# Patient Record
(demographics unavailable — no encounter records)

---

## 2024-10-11 NOTE — ASSESSMENT
[FreeTextEntry1] :  A and P   77 y old F with PMH of DM, HTN, CKD,  s/p R wrist fracture s/p fall 2017 and Cervical and lumbar Disc disease s/p lumbar spinal surgery at [place] > 5 years ago followed by Rehab medicine/PT outside of 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 Slade Baldwin. completed adjuvant chemotherapy for 4 cycles followed by 1 year of immunotherapy. Preferred adjuvant chemotherapy will consist of pemetrexed and carboplatin given non-squamous histology. She received 1 cycle of carboplatin only as her GFR was worsened than her baseline and she could not receive Pemetrexed. Changed adjuvant chemotherapy to carboplatin/gemcitabine. -She completed adjuvant carboplatin/gemcitabine on 9/28/2023,  -On pembrolizumab for 12 months per keynote 091 showing improvement in DFS and OS compared to placebo, due to arthritis with L hand swelling and whole body stiffness with also limited range of motion of bl shoulders vs weakness missed one dose  plan was total of 18 cycles of  Pembrolizumab  -took low dose steroid with improved L hand swelling denies joint pain but has diffuse body arthralgia and myalgia and bl UE stiffness with weakness that is new after starting immunotherapy possible due to inflammatory arthritis  but will evaluate for possibe paraneoplastic myositis also serologies sent -suggested prednisone 30 mg taper 10 mg weekly and plan for possible biologic tx to able to complete immunotherapy as planned by oncology discussed with the patient -DEXA to evaluate for osteoporosis history of R wrist fracture with fall on ice 2017 ,  -cw PT           Daughter: Mildred Devorah

## 2024-10-11 NOTE — PHYSICAL EXAM
[General Appearance - In No Acute Distress] : in no acute distress [PERRL With Normal Accommodation] : pupils were equal in size, round, and reactive to light [Examination Of The Oral Cavity] : the lips and gums were normal [Oropharynx] : the oropharynx was normal [Respiration, Rhythm And Depth] : normal respiratory rhythm and effort [Auscultation Breath Sounds / Voice Sounds] : lungs were clear to auscultation bilaterally [Chest Palpation] : palpation of the chest revealed no abnormalities [Heart Rate And Rhythm] : heart rate was normal and rhythm regular [Heart Sounds] : normal S1 and S2 [Murmurs] : no murmurs [Edema] : there was no peripheral edema [Bowel Sounds] : normal bowel sounds [Abdomen Soft] : soft [Abdomen Tenderness] : non-tender [No Spinal Tenderness] : no spinal tenderness [Musculoskeletal - Swelling] : no joint swelling seen [] : no rash [FreeTextEntry1] : proximal upper extremity muscle weakness unable to raise above 90 degress no sig tenderness , + pain with active range of motion with raising above 90 degress

## 2024-10-11 NOTE — HISTORY OF PRESENT ILLNESS
[FreeTextEntry1] : CC :   HPI:    s/p Lumbar spinal surgery for multiple lumbar disc disease  11/2008 was suggested also cervical that she did not do

## 2024-10-17 NOTE — END OF VISIT
[] : Fellow [FreeTextEntry3] : 76 yo F with stage IIIA KRAS G12V mutant NSCLC, adenocarcinoma histology, CKD, concurrent RCC on active surveillance here for f/u. She is due for C15 pembrolizumab this week. She now reports worsening of her joint aches, now limiting her ADLs.  Her daughter is helping her with everything and she is not able to cook, clean or go to the grocery store. She still has not seen the rheumatologist. -- Will give the patient a trial of prednisone 20 mg daily x 5 days --I personally reached out to the rheumatologist, Dr. Angelo to expedite her appointment -- Ordered CBC, CMP, TSH -- Hold immunotherapy with pembrolizumab until irAE resolved (currently grade III) -- The patient has an acute exacerbation of her chronic illness/severe side effect from treatment -- Patient is on treatment with immunotherapy pembrolizumab needing intensive monitoring for toxicity with CBC, CMP every 3 weeks and STH Q 6 wks --RTC in 3 wks --Labs: CBC, CMP, TSH

## 2024-10-17 NOTE — END OF VISIT
[] : Fellow [FreeTextEntry3] : 78 yo F with stage IIIA KRAS G12V mutant NSCLC, adenocarcinoma histology, CKD, concurrent RCC on active surveillance here for f/u. She is due for C15 pembrolizumab this week. She now reports worsening of her joint aches, now limiting her ADLs.  Her daughter is helping her with everything and she is not able to cook, clean or go to the grocery store. She still has not seen the rheumatologist. -- Will give the patient a trial of prednisone 20 mg daily x 5 days --I personally reached out to the rheumatologist, Dr. Angelo to expedite her appointment -- Ordered CBC, CMP, TSH -- Hold immunotherapy with pembrolizumab until irAE resolved (currently grade III) -- The patient has an acute exacerbation of her chronic illness/severe side effect from treatment -- Patient is on treatment with immunotherapy pembrolizumab needing intensive monitoring for toxicity with CBC, CMP every 3 weeks and STH Q 6 wks --RTC in 3 wks --Labs: CBC, CMP, TSH

## 2024-10-22 NOTE — PHYSICAL EXAM
[Restricted in physically strenuous activity but ambulatory and able to carry out work of a light or sedentary nature] : Status 1- Restricted in physically strenuous activity but ambulatory and able to carry out work of a light or sedentary nature, e.g., light house work, office work [de-identified] : Tachycardic. Regular rhythm. Normal S1S2, no MRG. [de-identified] : No calf swelling. [de-identified] : Left shoulder restricted in active and passive ROM in all directions, without pain. Full active and passive ROM in right shoulder. Full ROM in all other extremities and small joints. [de-identified] : scalp lesion stable and healed  [Normal] : full range of motion and no deformities appreciated [de-identified] : Left facial asymmetry (hx [place] palsy 2021). [de-identified] : Left eye ptosis and tearing, rare twitching (hx [place] palsy 2021). [de-identified] : +back brace

## 2024-10-22 NOTE — PHYSICAL EXAM
[Restricted in physically strenuous activity but ambulatory and able to carry out work of a light or sedentary nature] : Status 1- Restricted in physically strenuous activity but ambulatory and able to carry out work of a light or sedentary nature, e.g., light house work, office work [de-identified] : Tachycardic. Regular rhythm. Normal S1S2, no MRG. [de-identified] : No calf swelling. [de-identified] : Left shoulder restricted in active and passive ROM in all directions, without pain. Full active and passive ROM in right shoulder. Full ROM in all other extremities and small joints. [de-identified] : scalp lesion stable and healed  [Normal] : full range of motion and no deformities appreciated [de-identified] : Left facial asymmetry (hx [place] palsy 2021). [de-identified] : Left eye ptosis and tearing, rare twitching (hx [place] palsy 2021). [de-identified] : +back brace

## 2024-10-22 NOTE — HISTORY OF PRESENT ILLNESS
[Disease: _____________________] : Disease: [unfilled] [T: ___] : T[unfilled] [N: ___] : N[unfilled] [M: ___] : M[unfilled] [AJCC Stage: ____] : AJCC Stage: [unfilled] [3] : 3, Severe [D] : probable [FreeTextEntry2] : Started oral  prednisone  10/1/2024 [90: Able to carry normal activity; minor signs or symptoms of disease.] : 90: Able to carry normal activity; minor signs or symptoms of disease.  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  Daughter: Mildred Fairchild   Onc hx: 12/2022: started having chest congestion, went to her PCP, who ordered CXR which showed a lung mass. She then had CT chest which showed a LLL mass, and a PET scan which showed only LLL mass. CT chest (Nov 2022): Mixed density consolidation in the left lower lobe is again seen.  There is mild interval progression of the peribronchial changes extending to the posterior medial lung base.  The more solid component seen anteriorly, bulging the major fissure is more significantly progressed measuring up to 5.4 x 3.1 cm in axial cross-section, previously 2.7 x 1.4 cm. PET (Dec 2022): Mild heterogeneous hypermetabolic activity in large parenchymal consolidation associated with volume loss and bronchiectasis in the left lower lobe of the lung.  The most intense activity is in the periphery of this consolidation.  This area of slightly more intense hypermetabolic activity may represent active infection/inflammation superimposed upon chronic lung disease.  Status post thyroidectomy.  Diverticular disease.  Fibroid uterus. 2/14/2022: Was sent to Dr. Acosta by Dr. Dc 4/21/2023: Final Diagnosis Left lower lobe, lung biopsy: -   Bronchial wall and adjoining parenchyma with atypical micropapillary cell clusters, most consistent with mucinous adenocarcinoma (serial sections examined). Note: The micropapillary clusters are composed of eosinophilic columnar cells, some with intracytoplasmic mucin vacuoles. On IHC they stain strongly for CK7, weakly for GATA3, and are negative for ER/ND, PAX8, TTF1, Napsin, p40, CD68 and CD31. Overall findings are compatible with mucinous adenocarcinoma of lung. Upper GI/pancreaticobiliary cancer, is also in the differential. PD-L1 3%  5/2/23 CT CAP 1. Since 12/9/2022, no significant change in persistent consolidation in left lower lobe, consistent with the reported mucinous adenocarcinoma. No lymphadenopathy or metastatic disease. 2. A 2.0 cm left renal mass is suspicious for renal cell carcinoma, probably clear cell type. 3. There are a few small hypodensities in the pancreatic head, some of which may be cystic. MRI of pancreas recommended for further evaluation.  5/3/23 MR BRAIN No evidence of intracranial metastatic disease.  Chronic small vessel ischemic findings, as above.  5/4/2023: TTB - surgery upfront given node negativity  5/23/2023: Pathology: Final Diagnosis  1.  Left level 9 lymph node, excision: -   Negative for tumor, 2 lymph nodes  (0/2)  2.  Level 8 lymph node, excision: -   Negative for tumor, 4 lymph nodes  (0/4)  3.  Level 7 lymph node, excision: -   Negative for tumor, 2 lymph nodes  (0/2)  4.  Left level 11 lymph nodes, excision: -   Negative for tumor, 1 lymph node  (0/1)  5.  Left level 12 lymph node, excision: -   Negative for tumor, 1 lymph node  (0/1)  6.  Left level 11 lymph nodes #2, excision: -   Negative for tumor, 1 lymph nodes  (0/1)  7.  Level 5 lymph nodes, excision: -   Negative for tumor, 2 lymph nodes  (0/2)  8.  Left lower lobe of lung, lobectomy: -   Mucinous adenocarcinoma, multifocal, less than 9 cm, invasive acinar and lipidic -   Negative for pleural invasion -   No evidence of lymphovascular invasion -   Bronchial and vascular margins negative for tumor -   Seven lymph nodes, negative for tumor  (0/7) -   Desquamative interstitial pneumonitis, prominent -   Additional findings: bronchiolization of airspaces, multifocal, minute focus of pneumonia  Comment: The tumor is multifocal within the mass with multiple separate foci of invasion, the two largest measuring 1 cm each (8I, 8K).  The mass grossly measures 9 cm, however, a few sections described as mass are negative for tumor, showing only desquamative interstitial pneumonitis.  Verified by: Sissy Bae M.D (Electronic Signature) Reported on: 05/30/23 09:34 EDT, [facility], [address] [place] [address], [place] [id number] Phone: [phone]   Fax: [phone] _________________________________________________________________  Synoptic Summary 8: Lung - Resection Specimen Procedure:  Lobectomy Specimen Laterality:   Left Tumor Tumor Focality:   Multifocal tumor nodules of similar histology  type not considered intrapulmonary metastases or too numerous for separate synoptic reports Tumor Site:  Lower lobe of lung Tumor Size Total Tumor Size:   9 Centimeters (cm) Histologic Type:   Invasive mucinous adenocarcinoma Visceral Pleura Invasion:   Not identified Direct Invasion of Adjacent Structures:    Not applicable (no adjacent structures present) Treatment Effect:   No known presurgical therapy Lymphovascular Invasion:   Not identified Margins Margin Status for Invasive Carcinoma:    All margins negative for invasive carcinoma Closest Margin(s) to Invasive Carcinoma:    Bronchial - 0.5; Vascular - 0.5 Distance from Invasive Carcinoma to Closest Margin:    At least 0.5 cm Margin Status for Non-Invasive Tumor:    All margins negative for non-invasive tumor Regional Lymph Nodes Lymph Node(s) from Prior Procedures:    No known prior lymph node sampling performed Regional Lymph Node Status:   All regional lymph nodes negative for tumor Number of Lymph Nodes Examined:   20 Sujey Site(s) Examined:   7: Subcarinal;  5: Subaortic / aortopulmonary (AP) / AP window;  8L: Para-esophageal;  9L: Pulmonary ligament;  10L: Hilar;  11L: Interlobar;  12L: Lobar Pathologic Stage Classification (pTNM, AJCC 8th Edition) pT Category:   pT4 pN Category:   pN0 10/16/23: CT Chest: Since May 3, 2023, no recurrence.  12/29/23: MRI Lumbar:  1.  No evidence of osseous metastasis. 2.  L3-4 severe spinal canal stenosis with effacement of the thecal sac and severe bilateral neural foraminal narrowing. There is mass effect on the exiting and descending nerve roots at this level. 3.  L5-S1 disc herniation contacts the descending right S1 nerve root.  12/29/23: MRI abdomen:  1.  As noted on previous studies enhancing mass upper pole left kidney; stable when compared to the most recent study from 9/20/2023, however, increased when compared to CT from 5/3/2022. It is suspicious for neoplasm. 2.  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 Recommend continued annual follow-up MRI for these lesions.  3/15/24: CT Chest:  1. Since 10/16/2023, there is no evidence of recurrent or metastatic lung cancer in the chest. 2. No change 2.4 cm left renal mass, probably a clear cell type renal cell carcinoma.  6/17/2024: CT chest: 1. Post left lower lobe lobectomy. No evidence of local disease recurrence or metastasis within the field-of-view. 2. 2.4 cm stable exophytic heterogeneously enhancing renal mass. Renal neoplasm cannot be excluded and correlation with prior MRI suggested.  10/17/2024: CT chest: 1. Left lower lobectomy. 3 mm groundglass micronodule at the posterior, peripheral left midlung, of questionable clinical significance. 2. 2.7 cm enhancing exophytic mass at the upper left kidney, possibly malignancy. Correlate with prior MRI findings.  [de-identified] : Mucinous adenocarcinoma - PD-L1 3%, GNAS R201C, KRAS G12V, VUS in MARIO [de-identified] : Pulm:  CTSx:   [FreeTextEntry1] : 7/7/2023: C1 carboplatin (pemetrexed held due to worsening Cr and GFR<45) 7/28/2023: C2D1 carboplatin/gem 8/04/2023  C2D8 [place] 8/25/2023: C3D1 carboplatin/[place] 9/1/2023:   C3D8 [place]  9/15/2023  C4D1  carbo/gem  9/21/23   C4D8 gem hold, neutropenic  9/28/23   C4D8 [place] 11/3/2023: C1 pembrolizumab 12/8/2023: C2 pembrolizumab  1/18/2024  C3 Pembro 2/8/2024    C4 Pembro  3/1/24.  C5 Pembro 3/22/2024: C6 pembrolizumab 4/12/2024: C7 pembrolizumab 5/3/2024:  C8 Pembro  5/24/24    C9  pembro  6/14/24    C10  Pembro  7/2/24      C11 Pembro  7/26/24    C12 pembrolizumab 8/23/24    C13 pushed a week back due to travel  9/13/2024: C14 pembrolizumab [de-identified] : The patient saw a rheumatology recently for her arthritis and was started on a high-dose steroid taper.  She is currently taking prednisone 20 mg, and is supposed to decrease it to 10 mg as of next week. She reports significant improvement in her ADLs and is now able to do everything [1] : 1, Mild [FreeTextEntry3] : b/l hand stiffness [FreeTextEntry5] : andre  [FreeTextEntry7] : lung cancer  [FreeTextEntry8] : scalp psoriasis flare up  [FreeTextEntry9] : 2/29/24  [de-identified] : andre  [de-identified] : lung cancer  [de-identified] : Topical steroid ointment , improved 6/11/24

## 2024-10-22 NOTE — REVIEW OF SYSTEMS
[Negative] : Allergic/Immunologic [FreeTextEntry9] : B/L shoulders, fingers and knee pain intermittently [Fever] : no fever [Chills] : no chills [Night Sweats] : no night sweats [Eye Pain] : no eye pain [Vision Problems] : no vision problems [Dysphagia] : no dysphagia [Odynophagia] : no odynophagia [Chest Pain] : no chest pain [Lower Ext Edema] : no lower extremity edema [Shortness Of Breath] : no shortness of breath [Wheezing] : no wheezing [Cough] : no cough [Abdominal Pain] : no abdominal pain [Vomiting] : no vomiting [Constipation] : no constipation [Joint Pain] : no joint pain [Joint Stiffness] : no joint stiffness [Muscle Pain] : no muscle pain [Skin Rash] : no skin rash [Skin Wound] : no skin wound [Dizziness] : no dizziness [Muscle Weakness] : no muscle weakness

## 2024-10-22 NOTE — ASSESSMENT
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  #NSCLC stage IIIA (pT4 N0 M0) s/p left lower lung biopsy (04/20/23) with [employer] NGS showing low TMB (1.6 mutations/MB), MSI negative (4.88%), TPS 3%.  s/p left lower lobectomy with RLND, left VATS (05/23/23) with Dr. Slade Baldwin. 0/20 lymph nodes. Negative surgical margins. Tumor was multifocal within the mass with multiple separate foci of invasion, the two largest measuring 1 cm. The mass grossly measures 9 cm. Surgery was uneventful and patient is recovering well. - CrCl calculated to be < 40 mL/min. Per guidelines for treatment with curative intent, she is cisplatin-ineligible. - Given stage IIIA disease, completed adjuvant chemotherapy for 4 cycles followed by 1 year of immunotherapy. Preferred adjuvant chemotherapy will consist of pemetrexed and carboplatin given non-squamous histology. She received 1 cycle of carboplatin only as her GFR was worsened than her baseline and she could not receive Pemetrexed. Changed adjuvant chemotherapy to carboplatin/gemcitabine. -She completed adjuvant carboplatin/gemcitabine on 9/28/2023  --now on pembrolizumab for 12 months per keynote 091 showing improvement in DFS and OS compared to placebo --She was scheduled for C14 pembro tomorrow however will hold it given her arthritis. Will re-evaluate after rheum eval. --plan for total 18 cycles of pembrolizumab -- I independently reviewed the images of her CT chest which are showing a new small groundglass opacity in the left lung.  Given this is barely visible and the rest appears to be stable, will opt for close observation right now with every 3-month scan. --Given that she is going to be on prednisone 10 mg daily as of next week, plan to reschedule pembrolizumab for next Friday 11/1 -- Ordered CBC, CMP, TSH --The patient is on therapy with pembrolizumab requiring intensive monitoring for toxicity like immune related adverse events such as arthritis, also monitoring with labs CBC, CMP every 3 weeks and TSH every 6 weeks  #arthritis Had grade 3 arthritis after each treatment, likely pembrolizumab induced.  -- She is currently seeing rheumatology, and is following a steroid taper  #RCC - on observation per Dr. Parr

## 2024-10-31 NOTE — ASSESSMENT
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 Slade Baldwin. completed adjuvant chemotherapy for 4 cycles followed by 1 year of immunotherapy. Preferred adjuvant chemotherapy will consist of pemetrexed and carboplatin given non-squamous histology. She received 1 cycle of carboplatin only as her GFR was worsened than her baseline and she could not receive Pemetrexed. Changed adjuvant chemotherapy to carboplatin/gemcitabine. -She completed adjuvant carboplatin/gemcitabine on 9/28/2023,  -On pembrolizumab for 12 months per keynote 091 showing improvement in DFS and OS compared to placebo, due to arthritis with L hand swelling and whole body stiffness with also limited range of motion of bl shoulders vs weakness missed one dose  plan was total of 18 cycles of  Pembrolizumab  -took low dose steroid with improved L hand swelling denies joint pain but has diffuse body arthralgia and myalgia and bl UE stiffness with weakness that is new after starting immunotherapy possible due to inflammatory arthritis  but will evaluate for possible paraneoplastic myositis also serologies sent -last visit suggested prednisone 30 mg taper 10 mg weekly and plan for possible biologic tx to able to complete immunotherapy as planned by oncology discussed with the patient, swelling improved states prior to starting prednisone had swelling and stiffness but no joint pain, advised to decrease to 5 mg planned for tx this week if no worse symptoms might be able to continue Pembrolizumab - but discussed that arthritis frequently seen as complication if worse swelling or pain might not be able to continue with it  -+ DOMENIC  and RNP that we can see with mixed connective tissue disease will check myomarker panel rest of the sub serologies negative no other symptoms of Lupus Crest, Scleroderma or myositis   -DEXA to evaluate for osteoporosis history of R wrist fracture with fall on ice 2017 ,  -cw PT   Daughter: Mildred Devorah

## 2024-10-31 NOTE — ASSESSMENT
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 Slade Baldwin. completed adjuvant chemotherapy for 4 cycles followed by 1 year of immunotherapy. Preferred adjuvant chemotherapy will consist of pemetrexed and carboplatin given non-squamous histology. She received 1 cycle of carboplatin only as her GFR was worsened than her baseline and she could not receive Pemetrexed. Changed adjuvant chemotherapy to carboplatin/gemcitabine. -She completed adjuvant carboplatin/gemcitabine on 9/28/2023,  -On pembrolizumab for 12 months per keynote 091 showing improvement in DFS and OS compared to placebo, due to arthritis with L hand swelling and whole body stiffness with also limited range of motion of bl shoulders vs weakness missed one dose  plan was total of 18 cycles of  Pembrolizumab  -took low dose steroid with improved L hand swelling denies joint pain but has diffuse body arthralgia and myalgia and bl UE stiffness with weakness that is new after starting immunotherapy possible due to inflammatory arthritis  but will evaluate for possible paraneoplastic myositis also serologies sent -last visit suggested prednisone 30 mg taper 10 mg weekly and plan for possible biologic tx to able to complete immunotherapy as planned by oncology discussed with the patient, swelling improved states prior to starting prednisone had swelling and stiffness but no joint pain, advised to decrease to 5 mg planned for tx this week if no worse symptoms might be able to continue Pembrolizumab - but discussed that arthritis frequently seen as complication if worse swelling or pain might not be able to continue with it  -+ DOMENIC  and RNP that we can see with mixed connective tissue disease will check myomarker panel rest of the sub serologies negative no other symptoms of Lupus Crest, Scleroderma or myositis   -DEXA to evaluate for osteoporosis history of R wrist fracture with fall on ice 2017 ,  -cw PT   Daughter: Mildred Devorah

## 2024-10-31 NOTE — HISTORY OF PRESENT ILLNESS
[___ Week(s) Ago] : [unfilled] week(s) ago [FreeTextEntry1] : 10/11/24  first time evaluated by me for bl hand swelling and stiffness possible inflammatory arthritis after started tx with Pempbrolizumab for 12 month that was interrupted due to development of arthritis then started with Prednisone 30 mg with taper over last 3 weeks with resolved bl hand swelling and stiffness, denies pain even before arthritis  rest of the review system negative , prednisone down to 10 mg for this week

## 2024-11-18 NOTE — HISTORY OF PRESENT ILLNESS
[Disease: _____________________] : Disease: [unfilled] [T: ___] : T[unfilled] [N: ___] : N[unfilled] [M: ___] : M[unfilled] [AJCC Stage: ____] : AJCC Stage: [unfilled] [1] : 1, Mild [D] : probable [90: Able to carry normal activity; minor signs or symptoms of disease.] : 90: Able to carry normal activity; minor signs or symptoms of disease.  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  Daughter: Mildred Fairchild   Onc hx: 12/2022: started having chest congestion, went to her PCP, who ordered CXR which showed a lung mass. She then had CT chest which showed a LLL mass, and a PET scan which showed only LLL mass. CT chest (Nov 2022): Mixed density consolidation in the left lower lobe is again seen.  There is mild interval progression of the peribronchial changes extending to the posterior medial lung base.  The more solid component seen anteriorly, bulging the major fissure is more significantly progressed measuring up to 5.4 x 3.1 cm in axial cross-section, previously 2.7 x 1.4 cm. PET (Dec 2022): Mild heterogeneous hypermetabolic activity in large parenchymal consolidation associated with volume loss and bronchiectasis in the left lower lobe of the lung.  The most intense activity is in the periphery of this consolidation.  This area of slightly more intense hypermetabolic activity may represent active infection/inflammation superimposed upon chronic lung disease.  Status post thyroidectomy.  Diverticular disease.  Fibroid uterus. 2/14/2022: Was sent to Dr. Acosta by Dr. Dc 4/21/2023: Final Diagnosis Left lower lobe, lung biopsy: -   Bronchial wall and adjoining parenchyma with atypical micropapillary cell clusters, most consistent with mucinous adenocarcinoma (serial sections examined). Note: The micropapillary clusters are composed of eosinophilic columnar cells, some with intracytoplasmic mucin vacuoles. On IHC they stain strongly for CK7, weakly for GATA3, and are negative for ER/NH, PAX8, TTF1, Napsin, p40, CD68 and CD31. Overall findings are compatible with mucinous adenocarcinoma of lung. Upper GI/pancreaticobiliary cancer, is also in the differential. PD-L1 3%  5/2/23 CT CAP 1. Since 12/9/2022, no significant change in persistent consolidation in left lower lobe, consistent with the reported mucinous adenocarcinoma. No lymphadenopathy or metastatic disease. 2. A 2.0 cm left renal mass is suspicious for renal cell carcinoma, probably clear cell type. 3. There are a few small hypodensities in the pancreatic head, some of which may be cystic. MRI of pancreas recommended for further evaluation.  5/3/23 MR BRAIN No evidence of intracranial metastatic disease.  Chronic small vessel ischemic findings, as above.  5/4/2023: TTB - surgery upfront given node negativity  5/23/2023: Pathology: Final Diagnosis  1.  Left level 9 lymph node, excision: -   Negative for tumor, 2 lymph nodes  (0/2)  2.  Level 8 lymph node, excision: -   Negative for tumor, 4 lymph nodes  (0/4)  3.  Level 7 lymph node, excision: -   Negative for tumor, 2 lymph nodes  (0/2)  4.  Left level 11 lymph nodes, excision: -   Negative for tumor, 1 lymph node  (0/1)  5.  Left level 12 lymph node, excision: -   Negative for tumor, 1 lymph node  (0/1)  6.  Left level 11 lymph nodes #2, excision: -   Negative for tumor, 1 lymph nodes  (0/1)  7.  Level 5 lymph nodes, excision: -   Negative for tumor, 2 lymph nodes  (0/2)  8.  Left lower lobe of lung, lobectomy: -   Mucinous adenocarcinoma, multifocal, less than 9 cm, invasive acinar and lipidic -   Negative for pleural invasion -   No evidence of lymphovascular invasion -   Bronchial and vascular margins negative for tumor -   Seven lymph nodes, negative for tumor  (0/7) -   Desquamative interstitial pneumonitis, prominent -   Additional findings: bronchiolization of airspaces, multifocal, minute focus of pneumonia  Comment: The tumor is multifocal within the mass with multiple separate foci of invasion, the two largest measuring 1 cm each (8I, 8K).  The mass grossly measures 9 cm, however, a few sections described as mass are negative for tumor, showing only desquamative interstitial pneumonitis.  Verified by: Sissy Bae M.D (Electronic Signature) Reported on: 05/30/23 09:34 EDT, [facility], [address] [place] [address], [place] [id number] Phone: [phone]   Fax: [phone] _________________________________________________________________  Synoptic Summary 8: Lung - Resection Specimen Procedure:  Lobectomy Specimen Laterality:   Left Tumor Tumor Focality:   Multifocal tumor nodules of similar histology  type not considered intrapulmonary metastases or too numerous for separate synoptic reports Tumor Site:  Lower lobe of lung Tumor Size Total Tumor Size:   9 Centimeters (cm) Histologic Type:   Invasive mucinous adenocarcinoma Visceral Pleura Invasion:   Not identified Direct Invasion of Adjacent Structures:    Not applicable (no adjacent structures present) Treatment Effect:   No known presurgical therapy Lymphovascular Invasion:   Not identified Margins Margin Status for Invasive Carcinoma:    All margins negative for invasive carcinoma Closest Margin(s) to Invasive Carcinoma:    Bronchial - 0.5; Vascular - 0.5 Distance from Invasive Carcinoma to Closest Margin:    At least 0.5 cm Margin Status for Non-Invasive Tumor:    All margins negative for non-invasive tumor Regional Lymph Nodes Lymph Node(s) from Prior Procedures:    No known prior lymph node sampling performed Regional Lymph Node Status:   All regional lymph nodes negative for tumor Number of Lymph Nodes Examined:   20 Sujey Site(s) Examined:   7: Subcarinal;  5: Subaortic / aortopulmonary (AP) / AP window;  8L: Para-esophageal;  9L: Pulmonary ligament;  10L: Hilar;  11L: Interlobar;  12L: Lobar Pathologic Stage Classification (pTNM, AJCC 8th Edition) pT Category:   pT4 pN Category:   pN0 10/16/23: CT Chest: Since May 3, 2023, no recurrence.  12/29/23: MRI Lumbar:  1.  No evidence of osseous metastasis. 2.  L3-4 severe spinal canal stenosis with effacement of the thecal sac and severe bilateral neural foraminal narrowing. There is mass effect on the exiting and descending nerve roots at this level. 3.  L5-S1 disc herniation contacts the descending right S1 nerve root.  12/29/23: MRI abdomen:  1.  As noted on previous studies enhancing mass upper pole left kidney; stable when compared to the most recent study from 9/20/2023, however, increased when compared to CT from 5/3/2022. It is suspicious for neoplasm. 2.  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 Recommend continued annual follow-up MRI for these lesions.  3/15/24: CT Chest:  1. Since 10/16/2023, there is no evidence of recurrent or metastatic lung cancer in the chest. 2. No change 2.4 cm left renal mass, probably a clear cell type renal cell carcinoma.  6/17/2024: CT chest: 1. Post left lower lobe lobectomy. No evidence of local disease recurrence or metastasis within the field-of-view. 2. 2.4 cm stable exophytic heterogeneously enhancing renal mass. Renal neoplasm cannot be excluded and correlation with prior MRI suggested.  10/17/2024: CT chest: 1. Left lower lobectomy. 3 mm groundglass micronodule at the posterior, peripheral left midlung, of questionable clinical significance. 2. 2.7 cm enhancing exophytic mass at the upper left kidney, possibly malignancy. Correlate with prior MRI findings.  [de-identified] : Mucinous adenocarcinoma - PD-L1 3%, GNAS R201C, KRAS G12V, VUS in MARIO [de-identified] : Pulm:  CTSx:   [FreeTextEntry1] : 7/7/2023: C1 carboplatin (pemetrexed held due to worsening Cr and GFR<45) 7/28/2023: C2D1 carboplatin/gem 8/04/2023  C2D8 Ponce 8/25/2023: C3D1 carboplatin/Ponce 9/1/2023:   C3D8 Ponce  9/15/2023  C4D1  carbo/gem  9/21/23   C4D8 gem hold, neutropenic  9/28/23   C4D8 Ponce 11/3/2023: C1 pembrolizumab 12/8/2023: C2 pembrolizumab  1/18/2024  C3 Pembro 2/8/2024    C4 Pembro  3/1/24.  C5 Pembro 3/22/2024: C6 pembrolizumab 4/12/2024: C7 pembrolizumab 5/3/2024:  C8 Pembro  5/24/24    C9  pembro  6/14/24    C10  Pembro  7/2/24      C11 Pembro  7/26/24    C12 pembrolizumab 8/23/24    C13 pushed a week back due to travel  9/13/2024: C14 pembrolizumab 11/1/2024: C15 pembrolizumab (delayed due to ir arthritis needing steroid taper) 11/22/2024: Due for C16 pembrolizumab [de-identified] : The patient is off prednisone. Was told by rheumatologist she wants her to start prednisone 5 mg daily, but scripts were never sent per patient. Reports o/w feeling ok, except for recurrent flare ups of arthritis. [FreeTextEntry3] : b/l hand stiffness due to arthritis [FreeTextEntry5] : andre  [FreeTextEntry8] : scalp psoriasis flare up  [FreeTextEntry7] : lung cancer  [FreeTextEntry9] : 2/29/24  [de-identified] : andre  [de-identified] : lung cancer  [de-identified] : Topical steroid ointment , improved 6/11/24

## 2024-11-18 NOTE — REVIEW OF SYSTEMS
[Negative] : Allergic/Immunologic [Fever] : no fever [Chills] : no chills [Night Sweats] : no night sweats [Eye Pain] : no eye pain [Vision Problems] : no vision problems [Dysphagia] : no dysphagia [Odynophagia] : no odynophagia [Chest Pain] : no chest pain [Shortness Of Breath] : no shortness of breath [Lower Ext Edema] : no lower extremity edema [Wheezing] : no wheezing [Cough] : no cough [Abdominal Pain] : no abdominal pain [Vomiting] : no vomiting [Constipation] : no constipation [Joint Pain] : no joint pain [Joint Stiffness] : no joint stiffness [Muscle Pain] : no muscle pain [Skin Rash] : no skin rash [Skin Wound] : no skin wound [Dizziness] : no dizziness [Muscle Weakness] : no muscle weakness

## 2024-11-18 NOTE — PHYSICAL EXAM
[Restricted in physically strenuous activity but ambulatory and able to carry out work of a light or sedentary nature] : Status 1- Restricted in physically strenuous activity but ambulatory and able to carry out work of a light or sedentary nature, e.g., light house work, office work [Normal] : affect appropriate [de-identified] : Left facial asymmetry (hx [place] palsy 2021). [de-identified] : Left eye ptosis and tearing, rare twitching (hx [place] palsy 2021). [de-identified] : +back brace

## 2024-11-18 NOTE — REVIEW OF SYSTEMS
[Negative] : Allergic/Immunologic [Fever] : no fever [Chills] : no chills [Night Sweats] : no night sweats [Eye Pain] : no eye pain [Vision Problems] : no vision problems [Dysphagia] : no dysphagia [Odynophagia] : no odynophagia [Chest Pain] : no chest pain [Lower Ext Edema] : no lower extremity edema [Shortness Of Breath] : no shortness of breath [Wheezing] : no wheezing [Cough] : no cough [Abdominal Pain] : no abdominal pain [Vomiting] : no vomiting [Constipation] : no constipation [Joint Pain] : no joint pain [Joint Stiffness] : no joint stiffness [Muscle Pain] : no muscle pain [Skin Rash] : no skin rash [Skin Wound] : no skin wound [Dizziness] : no dizziness [Muscle Weakness] : no muscle weakness

## 2024-11-18 NOTE — ASSESSMENT
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  #NSCLC stage IIIA (pT4 N0 M0) s/p left lower lung biopsy (04/20/23) with [employer] NGS showing low TMB (1.6 mutations/MB), MSI negative (4.88%), TPS 3%.  s/p left lower lobectomy with RLND, left VATS (05/23/23) with Dr. Slade Baldwin. 0/20 lymph nodes. Negative surgical margins. Tumor was multifocal within the mass with multiple separate foci of invasion, the two largest measuring 1 cm. The mass grossly measures 9 cm. Surgery was uneventful and patient is recovering well. - CrCl calculated to be < 40 mL/min. Per guidelines for treatment with curative intent, she is cisplatin-ineligible. - Given stage IIIA disease, completed adjuvant chemotherapy for 4 cycles followed by 1 year of immunotherapy. Preferred adjuvant chemotherapy will consist of pemetrexed and carboplatin given non-squamous histology. She received 1 cycle of carboplatin only as her GFR was worsened than her baseline and she could not receive Pemetrexed. Changed adjuvant chemotherapy to carboplatin/gemcitabine. -She completed adjuvant carboplatin/gemcitabine on 9/28/2023  --now on pembrolizumab for 12 months per keynote 091 showing improvement in DFS and OS compared to placebo --planned for total 18 cycles of pembrolizumab -- Ordered CBC, CMP, TSH --noted Cr 1.8 today - I am concerned about pembrolizumab induced AIN - will refer back to , nephrology for evaluation --The patient is on therapy with pembrolizumab requiring intensive monitoring for toxicity like immune related adverse events such as arthritis, also monitoring with labs CBC, CMP every 3 weeks and TSH every 6 weeks  ROSALINDA on CKD - concerning for irAIN. --emailed  - will likely need to hold further immunotherapy pending his recommendations  Arthritis Had grade 3 arthritis after each treatment, likely pembrolizumab induced.  -- Spoke to  today. Summary being, she doesn't think the patient needs steroids. She will try to use other DMARD to ensure she can complete her adjuvant therapy. (3 more cycles left)  RCC - on observation per Dr. Parr

## 2024-11-18 NOTE — HISTORY OF PRESENT ILLNESS
[Disease: _____________________] : Disease: [unfilled] [T: ___] : T[unfilled] [N: ___] : N[unfilled] [M: ___] : M[unfilled] [AJCC Stage: ____] : AJCC Stage: [unfilled] [1] : 1, Mild [D] : probable [90: Able to carry normal activity; minor signs or symptoms of disease.] : 90: Able to carry normal activity; minor signs or symptoms of disease.  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  Daughter: Mildred Fairchild   Onc hx: 12/2022: started having chest congestion, went to her PCP, who ordered CXR which showed a lung mass. She then had CT chest which showed a LLL mass, and a PET scan which showed only LLL mass. CT chest (Nov 2022): Mixed density consolidation in the left lower lobe is again seen.  There is mild interval progression of the peribronchial changes extending to the posterior medial lung base.  The more solid component seen anteriorly, bulging the major fissure is more significantly progressed measuring up to 5.4 x 3.1 cm in axial cross-section, previously 2.7 x 1.4 cm. PET (Dec 2022): Mild heterogeneous hypermetabolic activity in large parenchymal consolidation associated with volume loss and bronchiectasis in the left lower lobe of the lung.  The most intense activity is in the periphery of this consolidation.  This area of slightly more intense hypermetabolic activity may represent active infection/inflammation superimposed upon chronic lung disease.  Status post thyroidectomy.  Diverticular disease.  Fibroid uterus. 2/14/2022: Was sent to Dr. Aocsta by Dr. Dc 4/21/2023: Final Diagnosis Left lower lobe, lung biopsy: -   Bronchial wall and adjoining parenchyma with atypical micropapillary cell clusters, most consistent with mucinous adenocarcinoma (serial sections examined). Note: The micropapillary clusters are composed of eosinophilic columnar cells, some with intracytoplasmic mucin vacuoles. On IHC they stain strongly for CK7, weakly for GATA3, and are negative for ER/NY, PAX8, TTF1, Napsin, p40, CD68 and CD31. Overall findings are compatible with mucinous adenocarcinoma of lung. Upper GI/pancreaticobiliary cancer, is also in the differential. PD-L1 3%  5/2/23 CT CAP 1. Since 12/9/2022, no significant change in persistent consolidation in left lower lobe, consistent with the reported mucinous adenocarcinoma. No lymphadenopathy or metastatic disease. 2. A 2.0 cm left renal mass is suspicious for renal cell carcinoma, probably clear cell type. 3. There are a few small hypodensities in the pancreatic head, some of which may be cystic. MRI of pancreas recommended for further evaluation.  5/3/23 MR BRAIN No evidence of intracranial metastatic disease.  Chronic small vessel ischemic findings, as above.  5/4/2023: TTB - surgery upfront given node negativity  5/23/2023: Pathology: Final Diagnosis  1.  Left level 9 lymph node, excision: -   Negative for tumor, 2 lymph nodes  (0/2)  2.  Level 8 lymph node, excision: -   Negative for tumor, 4 lymph nodes  (0/4)  3.  Level 7 lymph node, excision: -   Negative for tumor, 2 lymph nodes  (0/2)  4.  Left level 11 lymph nodes, excision: -   Negative for tumor, 1 lymph node  (0/1)  5.  Left level 12 lymph node, excision: -   Negative for tumor, 1 lymph node  (0/1)  6.  Left level 11 lymph nodes #2, excision: -   Negative for tumor, 1 lymph nodes  (0/1)  7.  Level 5 lymph nodes, excision: -   Negative for tumor, 2 lymph nodes  (0/2)  8.  Left lower lobe of lung, lobectomy: -   Mucinous adenocarcinoma, multifocal, less than 9 cm, invasive acinar and lipidic -   Negative for pleural invasion -   No evidence of lymphovascular invasion -   Bronchial and vascular margins negative for tumor -   Seven lymph nodes, negative for tumor  (0/7) -   Desquamative interstitial pneumonitis, prominent -   Additional findings: bronchiolization of airspaces, multifocal, minute focus of pneumonia  Comment: The tumor is multifocal within the mass with multiple separate foci of invasion, the two largest measuring 1 cm each (8I, 8K).  The mass grossly measures 9 cm, however, a few sections described as mass are negative for tumor, showing only desquamative interstitial pneumonitis.  Verified by: Sissy Bae M.D (Electronic Signature) Reported on: 05/30/23 09:34 EDT, [facility], [address] [place] [address], [place] [id number] Phone: [phone]   Fax: [phone] _________________________________________________________________  Synoptic Summary 8: Lung - Resection Specimen Procedure:  Lobectomy Specimen Laterality:   Left Tumor Tumor Focality:   Multifocal tumor nodules of similar histology  type not considered intrapulmonary metastases or too numerous for separate synoptic reports Tumor Site:  Lower lobe of lung Tumor Size Total Tumor Size:   9 Centimeters (cm) Histologic Type:   Invasive mucinous adenocarcinoma Visceral Pleura Invasion:   Not identified Direct Invasion of Adjacent Structures:    Not applicable (no adjacent structures present) Treatment Effect:   No known presurgical therapy Lymphovascular Invasion:   Not identified Margins Margin Status for Invasive Carcinoma:    All margins negative for invasive carcinoma Closest Margin(s) to Invasive Carcinoma:    Bronchial - 0.5; Vascular - 0.5 Distance from Invasive Carcinoma to Closest Margin:    At least 0.5 cm Margin Status for Non-Invasive Tumor:    All margins negative for non-invasive tumor Regional Lymph Nodes Lymph Node(s) from Prior Procedures:    No known prior lymph node sampling performed Regional Lymph Node Status:   All regional lymph nodes negative for tumor Number of Lymph Nodes Examined:   20 Sujey Site(s) Examined:   7: Subcarinal;  5: Subaortic / aortopulmonary (AP) / AP window;  8L: Para-esophageal;  9L: Pulmonary ligament;  10L: Hilar;  11L: Interlobar;  12L: Lobar Pathologic Stage Classification (pTNM, AJCC 8th Edition) pT Category:   pT4 pN Category:   pN0 10/16/23: CT Chest: Since May 3, 2023, no recurrence.  12/29/23: MRI Lumbar:  1.  No evidence of osseous metastasis. 2.  L3-4 severe spinal canal stenosis with effacement of the thecal sac and severe bilateral neural foraminal narrowing. There is mass effect on the exiting and descending nerve roots at this level. 3.  L5-S1 disc herniation contacts the descending right S1 nerve root.  12/29/23: MRI abdomen:  1.  As noted on previous studies enhancing mass upper pole left kidney; stable when compared to the most recent study from 9/20/2023, however, increased when compared to CT from 5/3/2022. It is suspicious for neoplasm. 2.  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 Recommend continued annual follow-up MRI for these lesions.  3/15/24: CT Chest:  1. Since 10/16/2023, there is no evidence of recurrent or metastatic lung cancer in the chest. 2. No change 2.4 cm left renal mass, probably a clear cell type renal cell carcinoma.  6/17/2024: CT chest: 1. Post left lower lobe lobectomy. No evidence of local disease recurrence or metastasis within the field-of-view. 2. 2.4 cm stable exophytic heterogeneously enhancing renal mass. Renal neoplasm cannot be excluded and correlation with prior MRI suggested.  10/17/2024: CT chest: 1. Left lower lobectomy. 3 mm groundglass micronodule at the posterior, peripheral left midlung, of questionable clinical significance. 2. 2.7 cm enhancing exophytic mass at the upper left kidney, possibly malignancy. Correlate with prior MRI findings.  [de-identified] : Mucinous adenocarcinoma - PD-L1 3%, GNAS R201C, KRAS G12V, VUS in MARIO [de-identified] : Pulm:  CTSx:   [FreeTextEntry1] : 7/7/2023: C1 carboplatin (pemetrexed held due to worsening Cr and GFR<45) 7/28/2023: C2D1 carboplatin/gem 8/04/2023  C2D8 [place] 8/25/2023: C3D1 carboplatin/[place] 9/1/2023:   C3D8 [place]  9/15/2023  C4D1  carbo/gem  9/21/23   C4D8 gem hold, neutropenic  9/28/23   C4D8 [place] 11/3/2023: C1 pembrolizumab 12/8/2023: C2 pembrolizumab  1/18/2024  C3 Pembro 2/8/2024    C4 Pembro  3/1/24.  C5 Pembro 3/22/2024: C6 pembrolizumab 4/12/2024: C7 pembrolizumab 5/3/2024:  C8 Pembro  5/24/24    C9  pembro  6/14/24    C10  Pembro  7/2/24      C11 Pembro  7/26/24    C12 pembrolizumab 8/23/24    C13 pushed a week back due to travel  9/13/2024: C14 pembrolizumab 11/1/2024: C15 pembrolizumab (delayed due to ir arthritis needing steroid taper) 11/22/2024: Due for C16 pembrolizumab [de-identified] : The patient is off prednisone. Was told by rheumatologist she wants her to start prednisone 5 mg daily, but scripts were never sent per patient. Reports o/w feeling ok, except for recurrent flare ups of arthritis. [FreeTextEntry3] : b/l hand stiffness due to arthritis [FreeTextEntry5] : andre  [FreeTextEntry8] : scalp psoriasis flare up  [FreeTextEntry7] : lung cancer  [FreeTextEntry9] : 2/29/24  [de-identified] : andre  [de-identified] : lung cancer  [de-identified] : Topical steroid ointment , improved 6/11/24

## 2024-11-18 NOTE — PHYSICAL EXAM
[Restricted in physically strenuous activity but ambulatory and able to carry out work of a light or sedentary nature] : Status 1- Restricted in physically strenuous activity but ambulatory and able to carry out work of a light or sedentary nature, e.g., light house work, office work [Normal] : affect appropriate [de-identified] : Left facial asymmetry (hx [place] palsy 2021). [de-identified] : Left eye ptosis and tearing, rare twitching (hx [place] palsy 2021). [de-identified] : +back brace

## 2024-12-03 NOTE — HISTORY OF PRESENT ILLNESS
[___ Week(s) Ago] : [unfilled] week(s) ago [FreeTextEntry1] : 12/3/24  patient has body aches, improved hand swelling and pain, multiple joint pain and body aches, occasional morning stiffness, bl hand swelling improved, history of lumbar spinal surgery in the past years ago, and R wrist fracture did not require surgery 2017 ( after fall in the street), currently doing PT getting Chemotherapy with Pempbrolizumab, as swelling improved can continue as needed for Tx follow up with oncology if recurrent pain and swelling then restart Prednisone as discussed  currently off prednisone, with workup 10/31/24  + DOMENIC 1:160  and + RNP 1.4  no other symptoms to suggest mixed connective tissue disease will monitor symptoms as discussed, negative myomarker panel   10/11/24  first time evaluated by me for bl hand swelling and stiffness possible inflammatory arthritis after started tx with Pempbrolizumab for 12 month that was interrupted due to development of arthritis then started with Prednisone 30 mg with taper over last 3 weeks with resolved bl hand swelling and stiffness, denies pain even before arthritis  rest of the review system negative , prednisone down to 10 mg for this week

## 2024-12-03 NOTE — ASSESSMENT
[FreeTextEntry1] :  A and P   78 y old F with PMH of DM, HTN, CKD,  s/p R wrist fracture s/p fall 2017 and Cervical and lumbar Disc disease s/p lumbar spinal surgery at [facility] > 5 years ago followed by Rehab medicine/PT outside of [facility] has had steroid injections also for R shoulder rotator cuff tendinopathy  Stage III A ( pT4 N0 M0 ) invasive  mucinous adenocarcinoma ( PDL1 3%) and negative for actionable mutations s/p left lower lobectomy (05/23/23) ,left VATS (05/23/23) with Dr. Slade Baldwin. completed adjuvant chemotherapy for 4 cycles followed by 1 year of immunotherapy. Preferred adjuvant chemotherapy will consist of pemetrexed and carboplatin given non-squamous histology. She received 1 cycle of carboplatin only as her GFR was worsened than her baseline and she could not receive Pemetrexed. Changed adjuvant chemotherapy to carboplatin/gemcitabine. -She completed adjuvant carboplatin/gemcitabine on 9/28/2023,  -On pembrolizumab for 12 months per keynote 091 showing improvement in DFS and OS compared to placebo, due to arthritis with L hand swelling and whole body stiffness with also limited range of motion of bl shoulders vs weakness missed one dose  plan was total of 18 cycles of  Pembrolizumab , bl hand swelling improved with prednisone , currently body aches intermittent morning stiffness no synovitis on exam -with workup had + DOMENIC  1:160 and RNP 1.4   10/31/24  but no other symptoms of mixed connective tissue disease , Bharat marker panel was negative will monitor for symptoms as discussed   - has diffuse body arthralgia and myalgia and bl UE stiffness with weakness that is new after starting immunotherapy possible due to inflammatory arthritis , no weakness on exam today but feels week with walking doing PT , previous history of lumbar spinal surgery years ago  but will evaluate for possible paraneoplastic myositis also serologies sent -planned to continue Pembrolizumab - but discussed that arthritis frequently seen as complication if worse swelling or pain might not be able to continue with it if recurrent synovitis   -arthritis body aches and stiffness improved with prednisone, no current synovitis on exam, if recurrent arthritis after restating Chemotherapy discussed and given Steroid dose pack    -DEXA  11/19/24 Spine T score 0.6, Femoral neck  -0.7  and total hip -0.2  with normal bone density , history of R wrist fracture with fall on ice 2017 healed without surgery ,  will need repeat DEXA 2 years   -cw PT  doing for shoulder and neck will also benefit exercises for improved gait and stability   Daughter: Mildred Fairchild

## 2024-12-10 NOTE — END OF VISIT
[] : Fellow [FreeTextEntry3] : Seen with fellow, Dr.Anamika Covarrubias.  77 yo F with Stage IIIA NSCLC s/p resection and chemotherapy with carbo/gem x 4 cycles and pemrbolizumab, now completed one year of io therapy per KEYNOTE 091. Feels ok. Arthritis stable. Completed therapy, and no plan for further treatment unless progression of disease. Repeat Ct chest in January. RTC in February for follow up and scan review. --ordered CBC, CMP, TSH today [Time Spent: ___ minutes] : I have spent [unfilled] minutes of time on the encounter which excludes teaching and separately reported services.

## 2024-12-10 NOTE — HISTORY OF PRESENT ILLNESS
[Disease: _____________________] : Disease: [unfilled] [T: ___] : T[unfilled] [N: ___] : N[unfilled] [M: ___] : M[unfilled] [AJCC Stage: ____] : AJCC Stage: [unfilled] [1] : 1, Mild [D] : probable [90: Able to carry normal activity; minor signs or symptoms of disease.] : 90: Able to carry normal activity; minor signs or symptoms of disease.  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  Daughter: Mildred Fairchild   Onc hx: 12/2022: started having chest congestion, went to her PCP, who ordered CXR which showed a lung mass. She then had CT chest which showed a LLL mass, and a PET scan which showed only LLL mass. CT chest (Nov 2022): Mixed density consolidation in the left lower lobe is again seen.  There is mild interval progression of the peribronchial changes extending to the posterior medial lung base.  The more solid component seen anteriorly, bulging the major fissure is more significantly progressed measuring up to 5.4 x 3.1 cm in axial cross-section, previously 2.7 x 1.4 cm. PET (Dec 2022): Mild heterogeneous hypermetabolic activity in large parenchymal consolidation associated with volume loss and bronchiectasis in the left lower lobe of the lung.  The most intense activity is in the periphery of this consolidation.  This area of slightly more intense hypermetabolic activity may represent active infection/inflammation superimposed upon chronic lung disease.  Status post thyroidectomy.  Diverticular disease.  Fibroid uterus. 2/14/2022: Was sent to Dr. Acosta by Dr. Dc 4/21/2023: Final Diagnosis Left lower lobe, lung biopsy: -   Bronchial wall and adjoining parenchyma with atypical micropapillary cell clusters, most consistent with mucinous adenocarcinoma (serial sections examined). Note: The micropapillary clusters are composed of eosinophilic columnar cells, some with intracytoplasmic mucin vacuoles. On IHC they stain strongly for CK7, weakly for GATA3, and are negative for ER/MT, PAX8, TTF1, Napsin, p40, CD68 and CD31. Overall findings are compatible with mucinous adenocarcinoma of lung. Upper GI/pancreaticobiliary cancer, is also in the differential. PD-L1 3%  5/2/23 CT CAP 1. Since 12/9/2022, no significant change in persistent consolidation in left lower lobe, consistent with the reported mucinous adenocarcinoma. No lymphadenopathy or metastatic disease. 2. A 2.0 cm left renal mass is suspicious for renal cell carcinoma, probably clear cell type. 3. There are a few small hypodensities in the pancreatic head, some of which may be cystic. MRI of pancreas recommended for further evaluation.  5/3/23 MR BRAIN No evidence of intracranial metastatic disease.  Chronic small vessel ischemic findings, as above.  5/4/2023: TTB - surgery upfront given node negativity  5/23/2023: Pathology: Final Diagnosis  1.  Left level 9 lymph node, excision: -   Negative for tumor, 2 lymph nodes  (0/2)  2.  Level 8 lymph node, excision: -   Negative for tumor, 4 lymph nodes  (0/4)  3.  Level 7 lymph node, excision: -   Negative for tumor, 2 lymph nodes  (0/2)  4.  Left level 11 lymph nodes, excision: -   Negative for tumor, 1 lymph node  (0/1)  5.  Left level 12 lymph node, excision: -   Negative for tumor, 1 lymph node  (0/1)  6.  Left level 11 lymph nodes #2, excision: -   Negative for tumor, 1 lymph nodes  (0/1)  7.  Level 5 lymph nodes, excision: -   Negative for tumor, 2 lymph nodes  (0/2)  8.  Left lower lobe of lung, lobectomy: -   Mucinous adenocarcinoma, multifocal, less than 9 cm, invasive acinar and lipidic -   Negative for pleural invasion -   No evidence of lymphovascular invasion -   Bronchial and vascular margins negative for tumor -   Seven lymph nodes, negative for tumor  (0/7) -   Desquamative interstitial pneumonitis, prominent -   Additional findings: bronchiolization of airspaces, multifocal, minute focus of pneumonia  Comment: The tumor is multifocal within the mass with multiple separate foci of invasion, the two largest measuring 1 cm each (8I, 8K).  The mass grossly measures 9 cm, however, a few sections described as mass are negative for tumor, showing only desquamative interstitial pneumonitis.  Verified by: Sissy Bae M.D (Electronic Signature) Reported on: 05/30/23 09:34 EDT, [facility], [address] [place] [address], [place] [id number] Phone: [phone]   Fax: [phone] _________________________________________________________________  Synoptic Summary 8: Lung - Resection Specimen Procedure:  Lobectomy Specimen Laterality:   Left Tumor Tumor Focality:   Multifocal tumor nodules of similar histology  type not considered intrapulmonary metastases or too numerous for separate synoptic reports Tumor Site:  Lower lobe of lung Tumor Size Total Tumor Size:   9 Centimeters (cm) Histologic Type:   Invasive mucinous adenocarcinoma Visceral Pleura Invasion:   Not identified Direct Invasion of Adjacent Structures:    Not applicable (no adjacent structures present) Treatment Effect:   No known presurgical therapy Lymphovascular Invasion:   Not identified Margins Margin Status for Invasive Carcinoma:    All margins negative for invasive carcinoma Closest Margin(s) to Invasive Carcinoma:    Bronchial - 0.5; Vascular - 0.5 Distance from Invasive Carcinoma to Closest Margin:    At least 0.5 cm Margin Status for Non-Invasive Tumor:    All margins negative for non-invasive tumor Regional Lymph Nodes Lymph Node(s) from Prior Procedures:    No known prior lymph node sampling performed Regional Lymph Node Status:   All regional lymph nodes negative for tumor Number of Lymph Nodes Examined:   20 Sujey Site(s) Examined:   7: Subcarinal;  5: Subaortic / aortopulmonary (AP) / AP window;  8L: Para-esophageal;  9L: Pulmonary ligament;  10L: Hilar;  11L: Interlobar;  12L: Lobar Pathologic Stage Classification (pTNM, AJCC 8th Edition) pT Category:   pT4 pN Category:   pN0 10/16/23: CT Chest: Since May 3, 2023, no recurrence.  12/29/23: MRI Lumbar:  1.  No evidence of osseous metastasis. 2.  L3-4 severe spinal canal stenosis with effacement of the thecal sac and severe bilateral neural foraminal narrowing. There is mass effect on the exiting and descending nerve roots at this level. 3.  L5-S1 disc herniation contacts the descending right S1 nerve root.  12/29/23: MRI abdomen:  1.  As noted on previous studies enhancing mass upper pole left kidney; stable when compared to the most recent study from 9/20/2023, however, increased when compared to CT from 5/3/2022. It is suspicious for neoplasm. 2.  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 Recommend continued annual follow-up MRI for these lesions.  3/15/24: CT Chest:  1. Since 10/16/2023, there is no evidence of recurrent or metastatic lung cancer in the chest. 2. No change 2.4 cm left renal mass, probably a clear cell type renal cell carcinoma.  6/17/2024: CT chest: 1. Post left lower lobe lobectomy. No evidence of local disease recurrence or metastasis within the field-of-view. 2. 2.4 cm stable exophytic heterogeneously enhancing renal mass. Renal neoplasm cannot be excluded and correlation with prior MRI suggested.  10/17/2024: CT chest: 1. Left lower lobectomy. 3 mm groundglass micronodule at the posterior, peripheral left midlung, of questionable clinical significance. 2. 2.7 cm enhancing exophytic mass at the upper left kidney, possibly malignancy. Correlate with prior MRI findings.  [de-identified] : Mucinous adenocarcinoma - PD-L1 3%, GNAS R201C, KRAS G12V, VUS in MARIO [de-identified] : Pulm:  CTSx:   [FreeTextEntry1] : 7/7/2023: C1 carboplatin (pemetrexed held due to worsening Cr and GFR<45)  7/28/2023: C2D1 carboplatin/gem  8/04/2023  C2D8 [place]  8/25/2023: C3D1 carboplatin/[place]  9/1/2023:   C3D8 [place]   9/15/2023  C4D1  carbo/gem   9/21/23   C4D8 gem hold, neutropenic   9/28/23   C4D8 [place]  11/23/2023: C1 pembrolizumab  12/8/2023: C2 pembrolizumab   12/28/23 C3 pembro  1/18/2024  C4 Pembro  2/9/2024    C5 Pembro   3/1/24.  C6 Pembro  3/22/2024: C7 pembrolizumab  4/12/2024: C8 pembrolizumab  5/3/2024:  C9 Pembro   5/24/24    C10  pembro   6/14/24    C11  Pembro   7/5/24      C12 Pembro   7/26/24    C13 pembrolizumab  8/23/24    C14 pushed a week back due to travel   9/13/2024: C15 pembrolizumab  11/1/2024: C16 pembrolizumab   11/22/2024: C17 pembrolizumab  [de-identified] : The patient was seen and examined in the office today. The patient mentioned feeling well. She was restarted on steroids and has significant improvement in the joint pains. She denied having any other symptoms.  [FreeTextEntry3] : b/l hand stiffness due to arthritis [FreeTextEntry5] : andre  [FreeTextEntry7] : lung cancer  [FreeTextEntry8] : scalp psoriasis flare up  [FreeTextEntry9] : 2/29/24  [de-identified] : andre  [de-identified] : lung cancer  [de-identified] : Topical steroid ointment , improved 6/11/24

## 2024-12-10 NOTE — PHYSICAL EXAM
[Restricted in physically strenuous activity but ambulatory and able to carry out work of a light or sedentary nature] : Status 1- Restricted in physically strenuous activity but ambulatory and able to carry out work of a light or sedentary nature, e.g., light house work, office work [Normal] : affect appropriate [de-identified] : +back brace [de-identified] : Left facial asymmetry (hx Felch palsy 2021). [de-identified] : Left eye ptosis and tearing, rare twitching (hx Otter palsy 2021).

## 2024-12-10 NOTE — HISTORY OF PRESENT ILLNESS
[Disease: _____________________] : Disease: [unfilled] [T: ___] : T[unfilled] [N: ___] : N[unfilled] [M: ___] : M[unfilled] [AJCC Stage: ____] : AJCC Stage: [unfilled] [1] : 1, Mild [D] : probable [90: Able to carry normal activity; minor signs or symptoms of disease.] : 90: Able to carry normal activity; minor signs or symptoms of disease.  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  Daughter: Mildred Fairchild   Onc hx: 12/2022: started having chest congestion, went to her PCP, who ordered CXR which showed a lung mass. She then had CT chest which showed a LLL mass, and a PET scan which showed only LLL mass. CT chest (Nov 2022): Mixed density consolidation in the left lower lobe is again seen.  There is mild interval progression of the peribronchial changes extending to the posterior medial lung base.  The more solid component seen anteriorly, bulging the major fissure is more significantly progressed measuring up to 5.4 x 3.1 cm in axial cross-section, previously 2.7 x 1.4 cm. PET (Dec 2022): Mild heterogeneous hypermetabolic activity in large parenchymal consolidation associated with volume loss and bronchiectasis in the left lower lobe of the lung.  The most intense activity is in the periphery of this consolidation.  This area of slightly more intense hypermetabolic activity may represent active infection/inflammation superimposed upon chronic lung disease.  Status post thyroidectomy.  Diverticular disease.  Fibroid uterus. 2/14/2022: Was sent to Dr. Acosta by Dr. Dc 4/21/2023: Final Diagnosis Left lower lobe, lung biopsy: -   Bronchial wall and adjoining parenchyma with atypical micropapillary cell clusters, most consistent with mucinous adenocarcinoma (serial sections examined). Note: The micropapillary clusters are composed of eosinophilic columnar cells, some with intracytoplasmic mucin vacuoles. On IHC they stain strongly for CK7, weakly for GATA3, and are negative for ER/MI, PAX8, TTF1, Napsin, p40, CD68 and CD31. Overall findings are compatible with mucinous adenocarcinoma of lung. Upper GI/pancreaticobiliary cancer, is also in the differential. PD-L1 3%  5/2/23 CT CAP 1. Since 12/9/2022, no significant change in persistent consolidation in left lower lobe, consistent with the reported mucinous adenocarcinoma. No lymphadenopathy or metastatic disease. 2. A 2.0 cm left renal mass is suspicious for renal cell carcinoma, probably clear cell type. 3. There are a few small hypodensities in the pancreatic head, some of which may be cystic. MRI of pancreas recommended for further evaluation.  5/3/23 MR BRAIN No evidence of intracranial metastatic disease.  Chronic small vessel ischemic findings, as above.  5/4/2023: TTB - surgery upfront given node negativity  5/23/2023: Pathology: Final Diagnosis  1.  Left level 9 lymph node, excision: -   Negative for tumor, 2 lymph nodes  (0/2)  2.  Level 8 lymph node, excision: -   Negative for tumor, 4 lymph nodes  (0/4)  3.  Level 7 lymph node, excision: -   Negative for tumor, 2 lymph nodes  (0/2)  4.  Left level 11 lymph nodes, excision: -   Negative for tumor, 1 lymph node  (0/1)  5.  Left level 12 lymph node, excision: -   Negative for tumor, 1 lymph node  (0/1)  6.  Left level 11 lymph nodes #2, excision: -   Negative for tumor, 1 lymph nodes  (0/1)  7.  Level 5 lymph nodes, excision: -   Negative for tumor, 2 lymph nodes  (0/2)  8.  Left lower lobe of lung, lobectomy: -   Mucinous adenocarcinoma, multifocal, less than 9 cm, invasive acinar and lipidic -   Negative for pleural invasion -   No evidence of lymphovascular invasion -   Bronchial and vascular margins negative for tumor -   Seven lymph nodes, negative for tumor  (0/7) -   Desquamative interstitial pneumonitis, prominent -   Additional findings: bronchiolization of airspaces, multifocal, minute focus of pneumonia  Comment: The tumor is multifocal within the mass with multiple separate foci of invasion, the two largest measuring 1 cm each (8I, 8K).  The mass grossly measures 9 cm, however, a few sections described as mass are negative for tumor, showing only desquamative interstitial pneumonitis.  Verified by: Sissy Bae M.D (Electronic Signature) Reported on: 05/30/23 09:34 EDT, [facility], [address] [place] [address], [place] [id number] Phone: [phone]   Fax: [phone] _________________________________________________________________  Synoptic Summary 8: Lung - Resection Specimen Procedure:  Lobectomy Specimen Laterality:   Left Tumor Tumor Focality:   Multifocal tumor nodules of similar histology  type not considered intrapulmonary metastases or too numerous for separate synoptic reports Tumor Site:  Lower lobe of lung Tumor Size Total Tumor Size:   9 Centimeters (cm) Histologic Type:   Invasive mucinous adenocarcinoma Visceral Pleura Invasion:   Not identified Direct Invasion of Adjacent Structures:    Not applicable (no adjacent structures present) Treatment Effect:   No known presurgical therapy Lymphovascular Invasion:   Not identified Margins Margin Status for Invasive Carcinoma:    All margins negative for invasive carcinoma Closest Margin(s) to Invasive Carcinoma:    Bronchial - 0.5; Vascular - 0.5 Distance from Invasive Carcinoma to Closest Margin:    At least 0.5 cm Margin Status for Non-Invasive Tumor:    All margins negative for non-invasive tumor Regional Lymph Nodes Lymph Node(s) from Prior Procedures:    No known prior lymph node sampling performed Regional Lymph Node Status:   All regional lymph nodes negative for tumor Number of Lymph Nodes Examined:   20 Sujey Site(s) Examined:   7: Subcarinal;  5: Subaortic / aortopulmonary (AP) / AP window;  8L: Para-esophageal;  9L: Pulmonary ligament;  10L: Hilar;  11L: Interlobar;  12L: Lobar Pathologic Stage Classification (pTNM, AJCC 8th Edition) pT Category:   pT4 pN Category:   pN0 10/16/23: CT Chest: Since May 3, 2023, no recurrence.  12/29/23: MRI Lumbar:  1.  No evidence of osseous metastasis. 2.  L3-4 severe spinal canal stenosis with effacement of the thecal sac and severe bilateral neural foraminal narrowing. There is mass effect on the exiting and descending nerve roots at this level. 3.  L5-S1 disc herniation contacts the descending right S1 nerve root.  12/29/23: MRI abdomen:  1.  As noted on previous studies enhancing mass upper pole left kidney; stable when compared to the most recent study from 9/20/2023, however, increased when compared to CT from 5/3/2022. It is suspicious for neoplasm. 2.  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 Recommend continued annual follow-up MRI for these lesions.  3/15/24: CT Chest:  1. Since 10/16/2023, there is no evidence of recurrent or metastatic lung cancer in the chest. 2. No change 2.4 cm left renal mass, probably a clear cell type renal cell carcinoma.  6/17/2024: CT chest: 1. Post left lower lobe lobectomy. No evidence of local disease recurrence or metastasis within the field-of-view. 2. 2.4 cm stable exophytic heterogeneously enhancing renal mass. Renal neoplasm cannot be excluded and correlation with prior MRI suggested.  10/17/2024: CT chest: 1. Left lower lobectomy. 3 mm groundglass micronodule at the posterior, peripheral left midlung, of questionable clinical significance. 2. 2.7 cm enhancing exophytic mass at the upper left kidney, possibly malignancy. Correlate with prior MRI findings.  [de-identified] : Mucinous adenocarcinoma - PD-L1 3%, GNAS R201C, KRAS G12V, VUS in MARIO [de-identified] : Pulm:  CTSx:   [FreeTextEntry1] : 7/7/2023: C1 carboplatin (pemetrexed held due to worsening Cr and GFR<45)  7/28/2023: C2D1 carboplatin/gem  8/04/2023  C2D8 Hyde  8/25/2023: C3D1 carboplatin/Hyde  9/1/2023:   C3D8 Hyde   9/15/2023  C4D1  carbo/gem   9/21/23   C4D8 gem hold, neutropenic   9/28/23   C4D8 Hyde  11/23/2023: C1 pembrolizumab  12/8/2023: C2 pembrolizumab   12/28/23 C3 pembro  1/18/2024  C4 Pembro  2/9/2024    C5 Pembro   3/1/24.  C6 Pembro  3/22/2024: C7 pembrolizumab  4/12/2024: C8 pembrolizumab  5/3/2024:  C9 Pembro   5/24/24    C10  pembro   6/14/24    C11  Pembro   7/5/24      C12 Pembro   7/26/24    C13 pembrolizumab  8/23/24    C14 pushed a week back due to travel   9/13/2024: C15 pembrolizumab  11/1/2024: C16 pembrolizumab   11/22/2024: C17 pembrolizumab  [de-identified] : The patient was seen and examined in the office today. The patient mentioned feeling well. She was restarted on steroids and has significant improvement in the joint pains. She denied having any other symptoms.  [FreeTextEntry3] : b/l hand stiffness due to arthritis [FreeTextEntry5] : andre  [FreeTextEntry7] : lung cancer  [FreeTextEntry8] : scalp psoriasis flare up  [FreeTextEntry9] : 2/29/24  [de-identified] : andre  [de-identified] : lung cancer  [de-identified] : Topical steroid ointment , improved 6/11/24

## 2024-12-10 NOTE — ASSESSMENT
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  #NSCLC stage IIIA (pT4 N0 M0) s/p left lower lung biopsy (04/20/23) with [employer] NGS showing low TMB (1.6 mutations/MB), MSI negative (4.88%), TPS 3%.  s/p left lower lobectomy with RLND, left VATS (05/23/23) with Dr. Slade Baldwin. 0/20 lymph nodes. Negative surgical margins. Tumor was multifocal within the mass with multiple separate foci of invasion, the two largest measuring 1 cm. The mass grossly measures 9 cm. Surgery was uneventful and patient is recovering well. - CrCl calculated to be < 40 mL/min. Per guidelines for treatment with curative intent, she is cisplatin-ineligible. - Given stage IIIA disease, completed adjuvant chemotherapy for 4 cycles followed by 1 year of immunotherapy. Preferred adjuvant chemotherapy will consist of pemetrexed and carboplatin given non-squamous histology. She received 1 cycle of carboplatin only as her GFR was worsened than her baseline and she could not receive Pemetrexed. Changed adjuvant chemotherapy to carboplatin/gemcitabine. -She completed adjuvant carboplatin/gemcitabine on 9/28/2023 --She is s/p pembrolizumab for 17 cycles, completed on 11/22/2024 per keynote 091 which showed improvement in DFS and OS compared to placebo. -- Repeat scans in January. RTC on 2/4/25.  ROSALINDA on CKD - concerning for irAIN. --Noted Cr 1.5 today. Management per , nephrology. She has an appointment tomorrow.  Arthritis Had grade 3 arthritis after each treatment, likely pembrolizumab induced.  -- She is currrently on steriods. Follows up with rheumatology.  RCC - on observation per Dr. Parr

## 2024-12-10 NOTE — PHYSICAL EXAM
[Restricted in physically strenuous activity but ambulatory and able to carry out work of a light or sedentary nature] : Status 1- Restricted in physically strenuous activity but ambulatory and able to carry out work of a light or sedentary nature, e.g., light house work, office work [Normal] : affect appropriate [de-identified] : +back brace [de-identified] : Left facial asymmetry (hx Mayer palsy 2021). [de-identified] : Left eye ptosis and tearing, rare twitching (hx [place] palsy 2021).

## 2024-12-10 NOTE — ASSESSMENT
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  #NSCLC stage IIIA (pT4 N0 M0) s/p left lower lung biopsy (04/20/23) with [employer] NGS showing low TMB (1.6 mutations/MB), MSI negative (4.88%), TPS 3%.  s/p left lower lobectomy with RLND, left VATS (05/23/23) with Dr. Slade Baldwin. 0/20 lymph nodes. Negative surgical margins. Tumor was multifocal within the mass with multiple separate foci of invasion, the two largest measuring 1 cm. The mass grossly measures 9 cm. Surgery was uneventful and patient is recovering well. - CrCl calculated to be < 40 mL/min. Per guidelines for treatment with curative intent, she is cisplatin-ineligible. - Given stage IIIA disease, completed adjuvant chemotherapy for 4 cycles followed by 1 year of immunotherapy. Preferred adjuvant chemotherapy will consist of pemetrexed and carboplatin given non-squamous histology. She received 1 cycle of carboplatin only as her GFR was worsened than her baseline and she could not receive Pemetrexed. Changed adjuvant chemotherapy to carboplatin/gemcitabine. -She completed adjuvant carboplatin/gemcitabine on 9/28/2023 --She is s/p pembrolizumab for 17 cycles, completed on 11/22/2024 per keynote 091 which showed improvement in DFS and OS compared to placebo. -- Repeat scans in January. RTC on 2/4/25.  ROSALINDA on CKD - concerning for irAIN. --Noted Cr 1.5 today. Management per , nephrology. She has an appointment tomorrow.  Arthritis Had grade 3 arthritis after each treatment, likely pembrolizumab induced.  -- She is currrently on steriods. Follows up with rheumatology.  RCC - on observation per Dr. Parr

## 2024-12-10 NOTE — END OF VISIT
[] : Fellow [FreeTextEntry3] : Seen with fellow, Dr.Anamika Covarrubias.  79 yo F with Stage IIIA NSCLC s/p resection and chemotherapy with carbo/gem x 4 cycles and pemrbolizumab, now completed one year of io therapy per KEYNOTE 091. Feels ok. Arthritis stable. Completed therapy, and no plan for further treatment unless progression of disease. Repeat Ct chest in January. RTC in February for follow up and scan review. --ordered CBC, CMP, TSH today [Time Spent: ___ minutes] : I have spent [unfilled] minutes of time on the encounter which excludes teaching and separately reported services.

## 2024-12-17 NOTE — CONSULT LETTER
[Dear  ___] : Dear  [unfilled], [Courtesy Letter:] : I had the pleasure of seeing your patient, [unfilled], in my office today. [Consult Closing:] : Thank you very much for allowing me to participate in the care of this patient.  If you have any questions, please do not hesitate to contact me. [Sincerely,] : Sincerely, [Praneeth Mcgarry MD] : Praneeth Mcgarry MD  [Department of Otolaryngology, Head and Neck Surgery] : Department of Otolaryngology, Head and Neck Surgery [[facility]] : [facility]

## 2024-12-17 NOTE — PROCEDURE
[FreeTextEntry3] : from prior visit- Nasal Endoscopy: mild sinonasal mucosal erythema no mucopus or polyps, middle meati patent choana clear  Fiberoptic Laryngoscopy: upper airway widely patent right retropharyngeal carotid mild boggy interarytenoid and postcricoid edema no masses/lesions TVF symmetric and fully mobile

## 2024-12-17 NOTE — PHYSICAL EXAM
[de-identified] : soft, no masses/lesions; well healed anterior neck incision [Nasal Endoscopy Performed] : nasal endoscopy was performed, see procedure section for findings [Midline] : trachea located in midline position [Laryngoscopy Performed] : laryngoscopy was performed, see procedure section for findings [de-identified] : large tongue base, crowded opx [Normal] : no rashes [FreeTextEntry1] : Au: EAC w cerumen removed w curet, TM intact, ME clear

## 2024-12-17 NOTE — HISTORY OF PRESENT ILLNESS
[de-identified] : 78F here in followup.  Since last seen 6 months ago, from ENT standpoint, is doing well and status quo. Diet/lifestyle not very conducive to reflux control/ She remains on PPI which helps, but she is not consistent. She just completed course of IT for lung cancer - MRI next month.  For years, she c/o thick mucus and tickle in her throat w chronic throat clearing and coughing. She feels like she is choking. There is no voice change, wt loss or hemoptysis. There is no difficulty eating, breathing, swallowing or talking. There is no nasal congestion/obstruction, no green/yellow nasal drainage, no h/o sinusitis. Sx are throughout the day, worse when laying down at night.  No tobacco/etoh; she used to chew tobacco.  ROS otherwise unremarkable.

## 2024-12-17 NOTE — ASSESSMENT
[FreeTextEntry1] : 78F here in followup. Since last seen 6 months ago, from ENT standpoint, is doing well and status quo. Diet/lifestyle is not very conducive to reflux control. She remains on PPI which helps, but she is not consistent. She just completed IT for lung cancer. Head and neck exam today remains mostly unremarkable, as above. Mild cerumen was removed from both ears. ENT exam stable and unchanged. Regarding her coughing, phlegm, mucus - better controlled w tighter reflux management. For now, continue with the PPI and diet/lifestyle. Continue with pulmonary/medonc followup. RTO 6 months in followup.

## 2025-01-09 NOTE — PHYSICAL EXAM
[General Appearance - In No Acute Distress] : in no acute distress [PERRL With Normal Accommodation] : pupils were equal in size, round, and reactive to light [Examination Of The Oral Cavity] : the lips and gums were normal [Oropharynx] : the oropharynx was normal [] : the neck was supple [Respiration, Rhythm And Depth] : normal respiratory rhythm and effort [Auscultation Breath Sounds / Voice Sounds] : lungs were clear to auscultation bilaterally [Chest Palpation] : palpation of the chest revealed no abnormalities [Heart Rate And Rhythm] : heart rate was normal and rhythm regular [Heart Sounds] : normal S1 and S2 [Murmurs] : no murmurs [Edema] : there was no peripheral edema [Bowel Sounds] : normal bowel sounds [Abdomen Soft] : soft [Abdomen Tenderness] : non-tender [No Spinal Tenderness] : no spinal tenderness [FreeTextEntry1] : R foot ankle swelling , mild tenderness , tender Achilles tendon also no other joint tenderness or swelling , has boot for support

## 2025-01-09 NOTE — ASSESSMENT
[FreeTextEntry1] :  A and P   78 y old F with PMH of DM, HTN, CKD,  s/p R wrist fracture s/p fall 2017 and Cervical and lumbar Disc disease s/p lumbar spinal surgery at [place] > 5 years ago followed by Rehab medicine/PT outside of [facility] has had steroid injections also for R shoulder rotator cuff tendinopathy  Stage III A ( pT4 N0 M0 ) invasive  mucinous adenocarcinoma ( PDL1 3%) and negative for actionable mutations s/p left lower lobectomy (05/23/23) ,left VATS (05/23/23) with Dr. Slade Baldwin. completed adjuvant chemotherapy for 4 cycles followed by 1 year of immunotherapy. Preferred adjuvant chemotherapy will consist of pemetrexed and carboplatin given non-squamous histology. She received 1 cycle of carboplatin only as her GFR was worsened than her baseline and she could not receive Pemetrexed. Changed adjuvant chemotherapy to carboplatin/gemcitabine. -She completed adjuvant carboplatin/gemcitabine on 9/28/2023,  -On pembrolizumab for 12 months per keynote 091 showing improvement in DFS and OS compared to placebo, due to arthritis with L hand swelling and whole body stiffness with also limited range of motion of bl shoulders vs weakness missed one dose  plan was total of 18 cycles of  Pembrolizumab finished 11/22/24  , bl hand swelling improved with prednisone ,  body aches intermittent morning stiffness -with workup had + DOMENIC  1:160 and RNP 1.4   10/31/24  but no other symptoms of mixed connective tissue disease , Bharat marker panel was negative will monitor for symptoms as discussed   -last 1-2 weeks pain and swelling or R ankle evaluated by podiatry 1/25 and prior to that PMD gave Steroid dose pack no sig improvement , also had R ankle steroid injection mild improvement is able to ambulate with boot still swollen less painful no other joint pain or swelling , check ESR , CRP, Uric acid , will send us X ray of foot if no sig improvement suggested MRI has planned follow up with Podiatry   -previous history of lumbar spinal surgery years ago    -arthritis body aches and stiffness improved with prednisone, no current synovitis on exam, finished chemotherapy 11/22/24    -DEXA  11/19/24 Spine T score 0.6, Femoral neck  -0.7  and total hip -0.2  with normal bone density , history of R wrist fracture with fall on ice 2017 healed without surgery ,  will need repeat DEXA    Daughter: Mildred Fairchild

## 2025-01-28 NOTE — ASSESSMENT
[FreeTextEntry1] : DX: solid renal mass, lung cancer   Plan  We reviewed the possible underlying histology of solid enhancing renal masses, with the majority being malignant (~80%) whereas ~20% are benign (e.g. oncocytoma).  We discussed the role/possibility of percutaneous biopsy, with the associated risks, benefits, complications, and accuracy issues (e.g. risk of false negative results).  We discussed small but not zero chance of pulm met to kidney  The heterogeneous natural history/biology of renal cell carcinoma was discussed, including the fact that while many renal cancers are indolent, others behave aggresssively.    Options were reviewed including, not limited to, active surveillance (AS), surgical extirpation and ablation.  The risks of tumor growth and metastasis on active surveillance were reviewed, including the fact that metastatic progression on AS could mean missing the opportunity for cure.  The average growth rate of ~2-3 mm/year and metastasis rates of ~2-3% on AS over 5 year interval for small renal masses <4 cm was discussed.   With respect to treatment we reviewed ablation (cryosurgery, radiofrequency ablation), risks of recurrence, opportunities for salvage treatment, and imaging requirements for follow up.  Oncologic outcomes for ablation were reviewed.   With respect to surgery we reviewed nephron sparing surgery vs. radical nephrectomy, as well as open vs. minimally invasive surgical (MIS) approaches (e.g. laparoscopy and robotic assisted laparoscopic surgery).  Personal and institutional experience, as well as other published literature was reviewed.  Oncologic outcomes, renal functional outcomes were compared and contrasted between radical vs. NSS and open vs. MIS surgery.  Risks of acute kidney injury, medical/surgical chronic kidney disease (either exacerbation or new-onset), as well as risks of ESRD development were reviewed.   Risks of conversion from MIS to open surgery discussed.  Risks of converting from attempted partial nephrectomy to radical nephrectomy were discussed.  Risks of surgical complications were reviewed, including not limited to: bleeding//life-threatening hemorrhage, vascular/bowel/adjacent visceral organ injury, trocar/access injury, the possibility of recognized vs. unrecognized/delayed-recognition injury, risks of thermal/blunt/sharp/retraction injury, risks of DVT, PE, MI, death, risks of cardiopulmonary/anesthesia related complications, positional injury, infection/collection/abscess, wound complications/dehiscence/seroma/cellulitis, urinoma/fistula, ureteral injury/obstruction, as well as other complications  Given age, functional status, CKD, and pT4 lung cancer, with shared decision making elects scan in 6 months, pending oncology/pulm assessment   Antonino Parr MD, FACS, FRCS  of Urology [facility] Director of Laparoscopic and Robotic Surgery [facility] Director of Urology, [facility] Professor of Urology   (Office) [phone] (Cell)  [phone] [email or web address]   The total amount of time I have personally spent preparing for this visit, reviewing the patient's test results, obtaining external history, ordering tests/medications, documenting clinical information, communicating with and counseling the patient/family and/or caregiver(s), and spent face to face with the patient explaining the above was minutes =30

## 2025-01-28 NOTE — HISTORY OF PRESENT ILLNESS
[FreeTextEntry1] : CC: renal mass  Patient with history of pT4 lung cancer "Just finished" treatment with oncologist.  CT chest today - no read  Left renal mass now 2.5 x 2.9 x 2.5 cm Imaging from October 2024 mass measured 2.2 x 2.6 x 2.3 cm CT images personally reviewed and independently interpreted Left hilar mass; not good ablation candidate  She is seeing oncologist/pulm today   CMP personally reviewed and independently interpreted - CKD

## 2025-02-04 NOTE — HISTORY OF PRESENT ILLNESS
[Disease: _____________________] : Disease: [unfilled] [T: ___] : T[unfilled] [N: ___] : N[unfilled] [M: ___] : M[unfilled] [AJCC Stage: ____] : AJCC Stage: [unfilled] [1] : 1, Mild [D] : probable [90: Able to carry normal activity; minor signs or symptoms of disease.] : 90: Able to carry normal activity; minor signs or symptoms of disease.  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  Daughter: Mildred Fairchild   Onc hx: 12/2022: started having chest congestion, went to her PCP, who ordered CXR which showed a lung mass. She then had CT chest which showed a LLL mass, and a PET scan which showed only LLL mass. CT chest (Nov 2022): Mixed density consolidation in the left lower lobe is again seen.  There is mild interval progression of the peribronchial changes extending to the posterior medial lung base.  The more solid component seen anteriorly, bulging the major fissure is more significantly progressed measuring up to 5.4 x 3.1 cm in axial cross-section, previously 2.7 x 1.4 cm. PET (Dec 2022): Mild heterogeneous hypermetabolic activity in large parenchymal consolidation associated with volume loss and bronchiectasis in the left lower lobe of the lung.  The most intense activity is in the periphery of this consolidation.  This area of slightly more intense hypermetabolic activity may represent active infection/inflammation superimposed upon chronic lung disease.  Status post thyroidectomy.  Diverticular disease.  Fibroid uterus. 2/14/2022: Was sent to Dr. Acosta by Dr. Dc 4/21/2023: Final Diagnosis Left lower lobe, lung biopsy: -   Bronchial wall and adjoining parenchyma with atypical micropapillary cell clusters, most consistent with mucinous adenocarcinoma (serial sections examined). Note: The micropapillary clusters are composed of eosinophilic columnar cells, some with intracytoplasmic mucin vacuoles. On IHC they stain strongly for CK7, weakly for GATA3, and are negative for ER/IL, PAX8, TTF1, Napsin, p40, CD68 and CD31. Overall findings are compatible with mucinous adenocarcinoma of lung. Upper GI/pancreaticobiliary cancer, is also in the differential. PD-L1 3%  5/2/23 CT CAP 1. Since 12/9/2022, no significant change in persistent consolidation in left lower lobe, consistent with the reported mucinous adenocarcinoma. No lymphadenopathy or metastatic disease. 2. A 2.0 cm left renal mass is suspicious for renal cell carcinoma, probably clear cell type. 3. There are a few small hypodensities in the pancreatic head, some of which may be cystic. MRI of pancreas recommended for further evaluation.  5/3/23 MR BRAIN No evidence of intracranial metastatic disease.  Chronic small vessel ischemic findings, as above.  5/4/2023: TTB - surgery upfront given node negativity  5/23/2023: Pathology: Final Diagnosis  1.  Left level 9 lymph node, excision: -   Negative for tumor, 2 lymph nodes  (0/2)  2.  Level 8 lymph node, excision: -   Negative for tumor, 4 lymph nodes  (0/4)  3.  Level 7 lymph node, excision: -   Negative for tumor, 2 lymph nodes  (0/2)  4.  Left level 11 lymph nodes, excision: -   Negative for tumor, 1 lymph node  (0/1)  5.  Left level 12 lymph node, excision: -   Negative for tumor, 1 lymph node  (0/1)  6.  Left level 11 lymph nodes #2, excision: -   Negative for tumor, 1 lymph nodes  (0/1)  7.  Level 5 lymph nodes, excision: -   Negative for tumor, 2 lymph nodes  (0/2)  8.  Left lower lobe of lung, lobectomy: -   Mucinous adenocarcinoma, multifocal, less than 9 cm, invasive acinar and lipidic -   Negative for pleural invasion -   No evidence of lymphovascular invasion -   Bronchial and vascular margins negative for tumor -   Seven lymph nodes, negative for tumor  (0/7) -   Desquamative interstitial pneumonitis, prominent -   Additional findings: bronchiolization of airspaces, multifocal, minute focus of pneumonia  Comment: The tumor is multifocal within the mass with multiple separate foci of invasion, the two largest measuring 1 cm each (8I, 8K).  The mass grossly measures 9 cm, however, a few sections described as mass are negative for tumor, showing only desquamative interstitial pneumonitis.  Verified by: Sissy Bae M.D (Electronic Signature) Reported on: 05/30/23 09:34 EDT, [facility], [address] [place] [address], [place] [id number] Phone: [phone]   Fax: [phone] _________________________________________________________________  Synoptic Summary 8: Lung - Resection Specimen Procedure:  Lobectomy Specimen Laterality:   Left Tumor Tumor Focality:   Multifocal tumor nodules of similar histology  type not considered intrapulmonary metastases or too numerous for separate synoptic reports Tumor Site:  Lower lobe of lung Tumor Size Total Tumor Size:   9 Centimeters (cm) Histologic Type:   Invasive mucinous adenocarcinoma Visceral Pleura Invasion:   Not identified Direct Invasion of Adjacent Structures:    Not applicable (no adjacent structures present) Treatment Effect:   No known presurgical therapy Lymphovascular Invasion:   Not identified Margins Margin Status for Invasive Carcinoma:    All margins negative for invasive carcinoma Closest Margin(s) to Invasive Carcinoma:    Bronchial - 0.5; Vascular - 0.5 Distance from Invasive Carcinoma to Closest Margin:    At least 0.5 cm Margin Status for Non-Invasive Tumor:    All margins negative for non-invasive tumor Regional Lymph Nodes Lymph Node(s) from Prior Procedures:    No known prior lymph node sampling performed Regional Lymph Node Status:   All regional lymph nodes negative for tumor Number of Lymph Nodes Examined:   20 Sujey Site(s) Examined:   7: Subcarinal;  5: Subaortic / aortopulmonary (AP) / AP window;  8L: Para-esophageal;  9L: Pulmonary ligament;  10L: Hilar;  11L: Interlobar;  12L: Lobar Pathologic Stage Classification (pTNM, AJCC 8th Edition) pT Category:   pT4 pN Category:   pN0 10/16/23: CT Chest: Since May 3, 2023, no recurrence.  12/29/23: MRI Lumbar:  1.  No evidence of osseous metastasis. 2.  L3-4 severe spinal canal stenosis with effacement of the thecal sac and severe bilateral neural foraminal narrowing. There is mass effect on the exiting and descending nerve roots at this level. 3.  L5-S1 disc herniation contacts the descending right S1 nerve root.  12/29/23: MRI abdomen:  1.  As noted on previous studies enhancing mass upper pole left kidney; stable when compared to the most recent study from 9/20/2023, however, increased when compared to CT from 5/3/2022. It is suspicious for neoplasm. 2.  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 Recommend continued annual follow-up MRI for these lesions.  3/15/24: CT Chest:  1. Since 10/16/2023, there is no evidence of recurrent or metastatic lung cancer in the chest. 2. No change 2.4 cm left renal mass, probably a clear cell type renal cell carcinoma.  6/17/2024: CT chest: 1. Post left lower lobe lobectomy. No evidence of local disease recurrence or metastasis within the field-of-view. 2. 2.4 cm stable exophytic heterogeneously enhancing renal mass. Renal neoplasm cannot be excluded and correlation with prior MRI suggested.  10/17/2024: CT chest: 1. Left lower lobectomy. 3 mm groundglass micronodule at the posterior, peripheral left midlung, of questionable clinical significance. 2. 2.7 cm enhancing exophytic mass at the upper left kidney, possibly malignancy. Correlate with prior MRI findings.  1/13/25: MRI Abdomen: 1. Since 10/17/2024, there has been an increase in the size of the now 2.9 cm mass in the left kidney which is consistent with a clear cell renal cell carcinoma.  2. 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 Recommend follow-up MRI in one year.  1/28/25: CT Chest: 1.  Stable appearance of lungs. No suspicious pulmonary nodules. Stable post left lower lobectomy surgical changes. No evidence of tumor recurrence. 2.  Previously characterized, exophytic lesion in the upper pole of the left kidney. Possible malignancy. [de-identified] : Mucinous adenocarcinoma - PD-L1 3%, GNAS R201C, KRAS G12V, VUS in MARIO [de-identified] : Pulm:  CTSx:   [FreeTextEntry1] : 7/7/2023: C1 carboplatin (pemetrexed held due to worsening Cr and GFR<45)  7/28/2023: C2D1 carboplatin/gem  8/04/2023  C2D8 [place]  8/25/2023: C3D1 carboplatin/[place]  9/1/2023:   C3D8 [place]   9/15/2023  C4D1  carbo/gem   9/21/23   C4D8 gem hold, neutropenic   9/28/23   C4D8 [place]  11/23/2023: C1 pembrolizumab  12/8/2023: C2 pembrolizumab   12/28/23 C3 pembro  1/18/2024  C4 Pembro  2/9/2024    C5 Pembro   3/1/24.  C6 Pembro  3/22/2024: C7 pembrolizumab  4/12/2024: C8 pembrolizumab  5/3/2024:  C9 Pembro   5/24/24    C10  pembro   6/14/24    C11  Pembro   7/5/24      C12 Pembro   7/26/24    C13 pembrolizumab  8/23/24    C14 pushed a week back due to travel   9/13/2024: C15 pembrolizumab  11/1/2024: C16 pembrolizumab   11/22/2024: C17 pembrolizumab  [de-identified] : The patient was seen and examined in the office today. She is feeling well but her ankle is bothering her. Has an appointment with rheumatology.  [FreeTextEntry3] : b/l hand stiffness due to arthritis [FreeTextEntry5] : andre  [FreeTextEntry7] : lung cancer  [FreeTextEntry8] : scalp psoriasis flare up  [FreeTextEntry9] : 2/29/24  [de-identified] : andre  [de-identified] : lung cancer  [de-identified] : Topical steroid ointment , improved 6/11/24

## 2025-02-04 NOTE — PHYSICAL EXAM
[Restricted in physically strenuous activity but ambulatory and able to carry out work of a light or sedentary nature] : Status 1- Restricted in physically strenuous activity but ambulatory and able to carry out work of a light or sedentary nature, e.g., light house work, office work [Normal] : affect appropriate [de-identified] : Left facial asymmetry (hx [place] palsy 2021). [de-identified] : Left eye ptosis and tearing, rare twitching (hx [place] palsy 2021).

## 2025-02-04 NOTE — ASSESSMENT
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  #NSCLC stage IIIA (pT4 N0 M0) s/p left lower lung biopsy (04/20/23) with [employer] NGS showing low TMB (1.6 mutations/MB), MSI negative (4.88%), TPS 3%.  s/p left lower lobectomy with RLND, left VATS (05/23/23) with Dr. Slade Baldwin. 0/20 lymph nodes. Negative surgical margins. Tumor was multifocal within the mass with multiple separate foci of invasion, the two largest measuring 1 cm. The mass grossly measures 9 cm. Surgery was uneventful and patient is recovering well. - CrCl calculated to be < 40 mL/min. Per guidelines for treatment with curative intent, she is cisplatin-ineligible. - Given stage IIIA disease, completed adjuvant chemotherapy for 4 cycles followed by 1 year of immunotherapy. Preferred adjuvant chemotherapy will consist of pemetrexed and carboplatin given non-squamous histology. She received 1 cycle of carboplatin only as her GFR was worsened than her baseline and she could not receive Pemetrexed. Changed adjuvant chemotherapy to carboplatin/gemcitabine. -She completed adjuvant carboplatin/gemcitabine on 9/28/2023 --She is s/p pembrolizumab for 17 cycles, completed on 11/22/2024 per keynote 091 which showed improvement in DFS and OS compared to placebo. -- scans with no evidence of disease recurrence   ROSALINDA on CKD - concerning for irAIN. --Noted Cr 1.7 today. Management per , nephrology.  Arthritis Had grade 3 arthritis after each treatment, likely pembrolizumab induced.  -- She is currrently on steriods. Follows up with rheumatology.  RCC - on observation per Dr. Parr

## 2025-02-04 NOTE — END OF VISIT
[] : Fellow [Time Spent: ___ minutes] : I have spent [unfilled] minutes of time on the encounter which excludes teaching and separately reported services. [FreeTextEntry3] : Seen with fellow, Dr.Maria Porter.  77 yo F with Stage IIIA NSCLC s/p resection and chemotherapy with carbo/gem x 4 cycles and pembrolizumab, now completed one year of io therapy per KEYNOTE 091. Feels ok. Arthritis stable. Completed therapy, and no plan for further treatment unless progression of disease. Repeat Ct chest BERTHA --ordered CBC, CMP, TSH today --f/u with  regarding possible surgical intervention on kidney mass --RTC in 3 month with repeat CT chest

## 2025-02-04 NOTE — PHYSICAL EXAM
[Restricted in physically strenuous activity but ambulatory and able to carry out work of a light or sedentary nature] : Status 1- Restricted in physically strenuous activity but ambulatory and able to carry out work of a light or sedentary nature, e.g., light house work, office work [Normal] : affect appropriate [de-identified] : Left facial asymmetry (hx [place] palsy 2021). [de-identified] : Left eye ptosis and tearing, rare twitching (hx [place] palsy 2021).

## 2025-02-04 NOTE — ASSESSMENT
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  #NSCLC stage IIIA (pT4 N0 M0) s/p left lower lung biopsy (04/20/23) with [employer] NGS showing low TMB (1.6 mutations/MB), MSI negative (4.88%), TPS 3%.  s/p left lower lobectomy with RLND, left VATS (05/23/23) with Dr. Slade aBldwin. 0/20 lymph nodes. Negative surgical margins. Tumor was multifocal within the mass with multiple separate foci of invasion, the two largest measuring 1 cm. The mass grossly measures 9 cm. Surgery was uneventful and patient is recovering well. - CrCl calculated to be < 40 mL/min. Per guidelines for treatment with curative intent, she is cisplatin-ineligible. - Given stage IIIA disease, completed adjuvant chemotherapy for 4 cycles followed by 1 year of immunotherapy. Preferred adjuvant chemotherapy will consist of pemetrexed and carboplatin given non-squamous histology. She received 1 cycle of carboplatin only as her GFR was worsened than her baseline and she could not receive Pemetrexed. Changed adjuvant chemotherapy to carboplatin/gemcitabine. -She completed adjuvant carboplatin/gemcitabine on 9/28/2023 --She is s/p pembrolizumab for 17 cycles, completed on 11/22/2024 per keynote 091 which showed improvement in DFS and OS compared to placebo. -- scans with no evidence of disease recurrence   ROSALINDA on CKD - concerning for irAIN. --Noted Cr 1.7 today. Management per , nephrology.  Arthritis Had grade 3 arthritis after each treatment, likely pembrolizumab induced.  -- She is currrently on steriods. Follows up with rheumatology.  RCC - on observation per Dr. Parr

## 2025-02-04 NOTE — HISTORY OF PRESENT ILLNESS
[Disease: _____________________] : Disease: [unfilled] [T: ___] : T[unfilled] [N: ___] : N[unfilled] [M: ___] : M[unfilled] [AJCC Stage: ____] : AJCC Stage: [unfilled] [1] : 1, Mild [D] : probable [90: Able to carry normal activity; minor signs or symptoms of disease.] : 90: Able to carry normal activity; minor signs or symptoms of disease.  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  Daughter: Mildred Fairchild   Onc hx: 12/2022: started having chest congestion, went to her PCP, who ordered CXR which showed a lung mass. She then had CT chest which showed a LLL mass, and a PET scan which showed only LLL mass. CT chest (Nov 2022): Mixed density consolidation in the left lower lobe is again seen.  There is mild interval progression of the peribronchial changes extending to the posterior medial lung base.  The more solid component seen anteriorly, bulging the major fissure is more significantly progressed measuring up to 5.4 x 3.1 cm in axial cross-section, previously 2.7 x 1.4 cm. PET (Dec 2022): Mild heterogeneous hypermetabolic activity in large parenchymal consolidation associated with volume loss and bronchiectasis in the left lower lobe of the lung.  The most intense activity is in the periphery of this consolidation.  This area of slightly more intense hypermetabolic activity may represent active infection/inflammation superimposed upon chronic lung disease.  Status post thyroidectomy.  Diverticular disease.  Fibroid uterus. 2/14/2022: Was sent to Dr. Acosta by Dr. Dc 4/21/2023: Final Diagnosis Left lower lobe, lung biopsy: -   Bronchial wall and adjoining parenchyma with atypical micropapillary cell clusters, most consistent with mucinous adenocarcinoma (serial sections examined). Note: The micropapillary clusters are composed of eosinophilic columnar cells, some with intracytoplasmic mucin vacuoles. On IHC they stain strongly for CK7, weakly for GATA3, and are negative for ER/CT, PAX8, TTF1, Napsin, p40, CD68 and CD31. Overall findings are compatible with mucinous adenocarcinoma of lung. Upper GI/pancreaticobiliary cancer, is also in the differential. PD-L1 3%  5/2/23 CT CAP 1. Since 12/9/2022, no significant change in persistent consolidation in left lower lobe, consistent with the reported mucinous adenocarcinoma. No lymphadenopathy or metastatic disease. 2. A 2.0 cm left renal mass is suspicious for renal cell carcinoma, probably clear cell type. 3. There are a few small hypodensities in the pancreatic head, some of which may be cystic. MRI of pancreas recommended for further evaluation.  5/3/23 MR BRAIN No evidence of intracranial metastatic disease.  Chronic small vessel ischemic findings, as above.  5/4/2023: TTB - surgery upfront given node negativity  5/23/2023: Pathology: Final Diagnosis  1.  Left level 9 lymph node, excision: -   Negative for tumor, 2 lymph nodes  (0/2)  2.  Level 8 lymph node, excision: -   Negative for tumor, 4 lymph nodes  (0/4)  3.  Level 7 lymph node, excision: -   Negative for tumor, 2 lymph nodes  (0/2)  4.  Left level 11 lymph nodes, excision: -   Negative for tumor, 1 lymph node  (0/1)  5.  Left level 12 lymph node, excision: -   Negative for tumor, 1 lymph node  (0/1)  6.  Left level 11 lymph nodes #2, excision: -   Negative for tumor, 1 lymph nodes  (0/1)  7.  Level 5 lymph nodes, excision: -   Negative for tumor, 2 lymph nodes  (0/2)  8.  Left lower lobe of lung, lobectomy: -   Mucinous adenocarcinoma, multifocal, less than 9 cm, invasive acinar and lipidic -   Negative for pleural invasion -   No evidence of lymphovascular invasion -   Bronchial and vascular margins negative for tumor -   Seven lymph nodes, negative for tumor  (0/7) -   Desquamative interstitial pneumonitis, prominent -   Additional findings: bronchiolization of airspaces, multifocal, minute focus of pneumonia  Comment: The tumor is multifocal within the mass with multiple separate foci of invasion, the two largest measuring 1 cm each (8I, 8K).  The mass grossly measures 9 cm, however, a few sections described as mass are negative for tumor, showing only desquamative interstitial pneumonitis.  Verified by: Sissy Bae M.D (Electronic Signature) Reported on: 05/30/23 09:34 EDT, [facility], [address] [place] [address], [place] [address] Phone: [phone]   Fax: [phone] _________________________________________________________________  Synoptic Summary 8: Lung - Resection Specimen Procedure:  Lobectomy Specimen Laterality:   Left Tumor Tumor Focality:   Multifocal tumor nodules of similar histology  type not considered intrapulmonary metastases or too numerous for separate synoptic reports Tumor Site:  Lower lobe of lung Tumor Size Total Tumor Size:   9 Centimeters (cm) Histologic Type:   Invasive mucinous adenocarcinoma Visceral Pleura Invasion:   Not identified Direct Invasion of Adjacent Structures:    Not applicable (no adjacent structures present) Treatment Effect:   No known presurgical therapy Lymphovascular Invasion:   Not identified Margins Margin Status for Invasive Carcinoma:    All margins negative for invasive carcinoma Closest Margin(s) to Invasive Carcinoma:    Bronchial - 0.5; Vascular - 0.5 Distance from Invasive Carcinoma to Closest Margin:    At least 0.5 cm Margin Status for Non-Invasive Tumor:    All margins negative for non-invasive tumor Regional Lymph Nodes Lymph Node(s) from Prior Procedures:    No known prior lymph node sampling performed Regional Lymph Node Status:   All regional lymph nodes negative for tumor Number of Lymph Nodes Examined:   20 Sujey Site(s) Examined:   7: Subcarinal;  5: Subaortic / aortopulmonary (AP) / AP window;  8L: Para-esophageal;  9L: Pulmonary ligament;  10L: Hilar;  11L: Interlobar;  12L: Lobar Pathologic Stage Classification (pTNM, AJCC 8th Edition) pT Category:   pT4 pN Category:   pN0 10/16/23: CT Chest: Since May 3, 2023, no recurrence.  12/29/23: MRI Lumbar:  1.  No evidence of osseous metastasis. 2.  L3-4 severe spinal canal stenosis with effacement of the thecal sac and severe bilateral neural foraminal narrowing. There is mass effect on the exiting and descending nerve roots at this level. 3.  L5-S1 disc herniation contacts the descending right S1 nerve root.  12/29/23: MRI abdomen:  1.  As noted on previous studies enhancing mass upper pole left kidney; stable when compared to the most recent study from 9/20/2023, however, increased when compared to CT from 5/3/2022. It is suspicious for neoplasm. 2.  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 Recommend continued annual follow-up MRI for these lesions.  3/15/24: CT Chest:  1. Since 10/16/2023, there is no evidence of recurrent or metastatic lung cancer in the chest. 2. No change 2.4 cm left renal mass, probably a clear cell type renal cell carcinoma.  6/17/2024: CT chest: 1. Post left lower lobe lobectomy. No evidence of local disease recurrence or metastasis within the field-of-view. 2. 2.4 cm stable exophytic heterogeneously enhancing renal mass. Renal neoplasm cannot be excluded and correlation with prior MRI suggested.  10/17/2024: CT chest: 1. Left lower lobectomy. 3 mm groundglass micronodule at the posterior, peripheral left midlung, of questionable clinical significance. 2. 2.7 cm enhancing exophytic mass at the upper left kidney, possibly malignancy. Correlate with prior MRI findings.  1/13/25: MRI Abdomen: 1. Since 10/17/2024, there has been an increase in the size of the now 2.9 cm mass in the left kidney which is consistent with a clear cell renal cell carcinoma.  2. 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 Recommend follow-up MRI in one year.  1/28/25: CT Chest: 1.  Stable appearance of lungs. No suspicious pulmonary nodules. Stable post left lower lobectomy surgical changes. No evidence of tumor recurrence. 2.  Previously characterized, exophytic lesion in the upper pole of the left kidney. Possible malignancy. [de-identified] : Mucinous adenocarcinoma - PD-L1 3%, GNAS R201C, KRAS G12V, VUS in MARIO [de-identified] : Pulm:  CTSx:   [FreeTextEntry1] : 7/7/2023: C1 carboplatin (pemetrexed held due to worsening Cr and GFR<45)  7/28/2023: C2D1 carboplatin/gem  8/04/2023  C2D8 Blanco  8/25/2023: C3D1 carboplatin/Blanco  9/1/2023:   C3D8 Blanco   9/15/2023  C4D1  carbo/gem   9/21/23   C4D8 gem hold, neutropenic   9/28/23   C4D8 Blanco  11/23/2023: C1 pembrolizumab  12/8/2023: C2 pembrolizumab   12/28/23 C3 pembro  1/18/2024  C4 Pembro  2/9/2024    C5 Pembro   3/1/24.  C6 Pembro  3/22/2024: C7 pembrolizumab  4/12/2024: C8 pembrolizumab  5/3/2024:  C9 Pembro   5/24/24    C10  pembro   6/14/24    C11  Pembro   7/5/24      C12 Pembro   7/26/24    C13 pembrolizumab  8/23/24    C14 pushed a week back due to travel   9/13/2024: C15 pembrolizumab  11/1/2024: C16 pembrolizumab   11/22/2024: C17 pembrolizumab  [de-identified] : The patient was seen and examined in the office today. She is feeling well but her ankle is bothering her. Has an appointment with rheumatology.  [FreeTextEntry3] : b/l hand stiffness due to arthritis [FreeTextEntry5] : andre  [FreeTextEntry7] : lung cancer  [FreeTextEntry8] : scalp psoriasis flare up  [FreeTextEntry9] : 2/29/24  [de-identified] : andre  [de-identified] : lung cancer  [de-identified] : Topical steroid ointment , improved 6/11/24

## 2025-02-13 NOTE — PHYSICAL EXAM
[General Appearance - In No Acute Distress] : in no acute distress [PERRL With Normal Accommodation] : pupils were equal in size, round, and reactive to light [Examination Of The Oral Cavity] : the lips and gums were normal [Oropharynx] : the oropharynx was normal [] : the neck was supple [Respiration, Rhythm And Depth] : normal respiratory rhythm and effort [Auscultation Breath Sounds / Voice Sounds] : lungs were clear to auscultation bilaterally [Chest Palpation] : palpation of the chest revealed no abnormalities [Heart Rate And Rhythm] : heart rate was normal and rhythm regular [Heart Sounds] : normal S1 and S2 [Murmurs] : no murmurs [Edema] : there was no peripheral edema [Bowel Sounds] : normal bowel sounds [Abdomen Soft] : soft [Abdomen Tenderness] : non-tender [No Spinal Tenderness] : no spinal tenderness [FreeTextEntry1] : R ankle swelling mostly lateral maleolar and tenderness limited range of motion of ankle no other joint tenderness or swelling

## 2025-02-13 NOTE — HISTORY OF PRESENT ILLNESS
[___ Week(s) Ago] : [unfilled] week(s) ago [FreeTextEntry1] : 2/13 /25 78 y old M with PMH of stage IIIA invasive mucinous adenocarcinoma , s/p left lower lobectomy 5/23/23 followed by DR Meghan Teran, also renal Mass followed by Urology  s/p Lumbar spinal surgery for multiple lumbar disc disease 11/2008 was suggested also cervical surgery that she did not do, evaluated initially due to bl hand swelling and pain responded to prednisone possible in the setting of chemotherapy but since 12/24 worse L ankle pain and intermittent swelling partially improved with steroid, evaluated by Podiatry intra articular injection 12/24 did not help her    R ankle pain and swelling partially improved with prednisone, intra articular injection done by Podiatry prior to last visit did not help much no fever no other joint pain or swelling denies recent trauma   MRI of R  ankle:  ordered by Podiatry Dr Gustavo Kwong 1/20/25   diffuse subacutaneous and intramuscular edema about the ankle, also small focus of remote osteochondral injury along the medial talar bone,  small tibiotalar and posterior subtalar joint effusion with synovitis, mild second tarsometatarsal joint osteoarthrosis mild peroneal tenosynovitis without tendinosis or tear   2/4/25 Chest CT with IV contrast 1.  Stable appearance of lungs. No suspicious pulmonary nodules. Stable post left lower lobectomy surgical changes. No evidence of tumor recurrence. 2.  Previously characterized, exophytic lesion in the upper pole of the left kidney. Possible malignancy.    12/3/24  patient has body aches, improved hand swelling and pain, multiple joint pain and body aches, occasional morning stiffness, bl hand swelling improved, history of lumbar spinal surgery in the past years ago, and R wrist fracture did not require surgery 2017 ( after fall in the street), currently doing PT getting Chemotherapy with Pempbrolizumab, as swelling improved can continue as needed for Tx follow up with oncology if recurrent pain and swelling then restart Prednisone as discussed  currently off prednisone, with workup 10/31/24  + DOMENIC 1:160  and + RNP 1.4  no other symptoms to suggest mixed connective tissue disease will monitor symptoms as discussed, negative myomarker panel   10/11/24  first time evaluated by me for bl hand swelling and stiffness possible inflammatory arthritis after started tx with Pempbrolizumab for 12 month that was interrupted due to development of arthritis then started with Prednisone 30 mg with taper over last 3 weeks with resolved bl hand swelling and stiffness, denies pain even before arthritis  rest of the review system negative , prednisone down to 10 mg for this week

## 2025-02-13 NOTE — ASSESSMENT
[FreeTextEntry1] :  A and P   78 y old F with PMH of DM, HTN, CKD,  s/p R wrist fracture s/p fall 2017 and Cervical and lumbar Disc disease s/p lumbar spinal surgery at [facility] > 5 years ago followed by Rehab medicine/PT outside of [facility] has had steroid injections also for R shoulder rotator cuff tendinopathy  Stage III A ( pT4 N0 M0 ) invasive  mucinous adenocarcinoma ( PDL1 3%) and negative for actionable mutations s/p left lower lobectomy (05/23/23) ,left VATS (05/23/23) with Dr. Slade Baldwin. completed adjuvant chemotherapy for 4 cycles followed by 1 year of immunotherapy. Preferred adjuvant chemotherapy will consist of pemetrexed and carboplatin given non-squamous histology. She received 1 cycle of carboplatin only as her GFR was worsened than her baseline and she could not receive Pemetrexed. Changed adjuvant chemotherapy to carboplatin/gemcitabine. -She completed adjuvant carboplatin/gemcitabine on 9/28/2023,  -On pembrolizumab for 12 months per keynote 091 showing improvement in DFS and OS compared to placebo, due to arthritis with L hand swelling and whole body stiffness with also limited range of motion of bl shoulders vs weakness missed one dose  plan was total of 18 cycles of  Pembrolizumab finished 11/22/24  , bl hand swelling improved with prednisone ,  body aches intermittent morning stiffness -with workup had + DOMENIC  1:160 and RNP 1.4   10/31/24  but no other symptoms of mixed connective tissue disease , Bharat marker panel was negative will monitor for symptoms as discussed , bl hand pain and swelling improved tx with prednisone  -since 12/24  pain and swelling or R ankle evaluated by podiatry 1/25 and prior to that PMD gave Steroid dose pack partial improvement , also had R ankle steroid injection mild improvement is able to ambulate with boot still swollen less painful no other joint pain or swelling ,  -MRI of L ankle:  ordered by Podiatry Dr Gustavo Kwong 1/20/25   diffuse subacutaneous and intramuscular edema about the ankle, also small focus of remote osteochondral injury along the medial talar bone,  small tibiotalar and posterior subtalar joint effusion with synovitis, mild second tarsometatarsal joint osteoarthrosis mild peroneal tenosynovitis without tendinosis or tear  -gave medrol Dose pack has progression CKD, crystal arthritis also possible repeat blood work today   2/4/25 Chest CT with IV contrast 1.  Stable appearance of lungs. No suspicious pulmonary nodules. Stable post left lower lobectomy surgical changes. No evidence of tumor recurrence. 2.  Previously characterized, exophytic lesion in the upper pole of the left kidney. Possible malignancy. -followed by Urology due to renal mass concerning for malignancy    -previous history of lumbar spinal surgery years ago    -arthritis body aches and stiffness improved with prednisone, no current synovitis on exam, finished chemotherapy 11/22/24    -DEXA  11/19/24 Spine T score 0.6, Femoral neck  -0.7  and total hip -0.2  with normal bone density , history of R wrist fracture with fall on ice 2017 healed without surgery ,     Daughter: Mildred Fairchild

## 2025-05-08 NOTE — ASSESSMENT
[FreeTextEntry1] :  A and P   78 y old F with PMH of DM, HTN, CKD,  s/p R wrist fracture s/p fall 2017 and Cervical and lumbar Disc disease s/p lumbar spinal surgery at [facility] > 5 years ago followed by Rehab medicine/PT outside of [facility] has had steroid injections also for R shoulder rotator cuff tendinopathy  Stage III A ( pT4 N0 M0 ) invasive  mucinous adenocarcinoma ( PDL1 3%) and negative for actionable mutations s/p left lower lobectomy (05/23/23) ,left VATS (05/23/23) with Dr. Slade Baldwin. completed adjuvant chemotherapy for 4 cycles followed by 1 year of immunotherapy. Preferred adjuvant chemotherapy will consist of pemetrexed and carboplatin given non-squamous histology. She received 1 cycle of carboplatin only as her GFR was worsened than her baseline and she could not receive Pemetrexed. Changed adjuvant chemotherapy to carboplatin/gemcitabine. -She completed adjuvant carboplatin/gemcitabine on 9/28/2023,  -On pembrolizumab for 12 months per keynote 091 showing improvement in DFS and OS compared to placebo, due to arthritis with L hand swelling and whole body stiffness with also limited range of motion of bl shoulders vs weakness missed one dose  plan was total of 18 cycles of  Pembrolizumab finished 11/22/24  , bl hand swelling improved with prednisone ,  body aches intermittent morning stiffness -with workup had + DOMENIC  1:160 and RNP 1.4   10/31/24  but no other symptoms of mixed connective tissue disease , Bharat marker panel was negative will monitor for symptoms as discussed , bl hand pain and swelling improved tx with prednisone only persistent R ankle pain that is improving with therapy , also has chronic lower back pain and shoulder pain no sig stiffness   -since 12/24  pain and swelling or R ankle evaluated by podiatry 1/25 and prior to that PMD gave Steroid dose pack partial improvement , also had R ankle steroid injection mild improvement is able to ambulate with boot still swollen less painful no other joint pain or swelling , and partially improved with PT able to ambulate   -MRI of L ankle:  ordered by Podiatry Dr Gustavo Kwong 1/20/25   diffuse subacutaneous and intramuscular edema about the ankle, also small focus of remote osteochondral injury along the medial talar bone,  small tibiotalar and posterior subtalar joint effusion with synovitis, mild second tarsometatarsal joint osteoarthrosis mild peroneal tenosynovitis without tendinosis or tear  -gave medrol Dose pack has progression CKD, crystal arthritis also possible but as pain improving PT less likely due to multiarticular arthritis up trending inflammatory markers can be infectious inflammatory and due to malignancy pending workup for renal mass , no symptoms for GCA currently   2/4/25 Chest CT with IV contrast 1.  Stable appearance of lungs. No suspicious pulmonary nodules. Stable post left lower lobectomy surgical changes. No evidence of tumor recurrence. 2.  Previously characterized, exophytic lesion in the upper pole of the left kidney. Possible malignancy. -followed by Urology due to renal mass concerning for malignancy    -previous history of lumbar spinal surgery years ago , due to recurrent back pain planned to follow up with [facility]    -arthritis body aches and stiffness improved with prednisone, no current synovitis on exam, finished chemotherapy 11/22/24 no current pain or swelling of hands    -DEXA  11/19/24 Spine T score 0.6, Femoral neck  -0.7  and total hip -0.2  with normal bone density , history of R wrist fracture with fall on ice 2017 healed without surgery ,     Daughter: Mildred Fairchild

## 2025-05-08 NOTE — HISTORY OF PRESENT ILLNESS
[___ Week(s) Ago] : [unfilled] week(s) ago [FreeTextEntry1] : 5/8/2025  patient bl hand pain and swelling improved possible arthritis with previous chemotherapy  persistent R ankle pain and mild swelling off steroids up trending inflammatory markers had repeat CT chest and has follow up with oncology has exophytic renal mass that workup is pending for  no increased headache or vision changes since last visit no jaw claudication   2/13 /25 78 y old M with PMH of stage IIIA invasive mucinous adenocarcinoma , s/p left lower lobectomy 5/23/23 followed by DR Meghan Teran, also renal Mass followed by Urology  s/p Lumbar spinal surgery for multiple lumbar disc disease 11/2008 was suggested also cervical surgery that she did not do, evaluated initially due to bl hand swelling and pain responded to prednisone possible in the setting of chemotherapy but since 12/24 worse L ankle pain and intermittent swelling partially improved with steroid, evaluated by Podiatry intra articular injection 12/24 did not help her    R ankle pain and swelling partially improved with prednisone, intra articular injection done by Podiatry prior to last visit did not help much no fever no other joint pain or swelling denies recent trauma   MRI of R  ankle:  ordered by Podiatry Dr Gustavo Kwong 1/20/25   diffuse subacutaneous and intramuscular edema about the ankle, also small focus of remote osteochondral injury along the medial talar bone,  small tibiotalar and posterior subtalar joint effusion with synovitis, mild second tarsometatarsal joint osteoarthrosis mild peroneal tenosynovitis without tendinosis or tear   2/4/25 Chest CT with IV contrast 1.  Stable appearance of lungs. No suspicious pulmonary nodules. Stable post left lower lobectomy surgical changes. No evidence of tumor recurrence. 2.  Previously characterized, exophytic lesion in the upper pole of the left kidney. Possible malignancy.    12/3/24  patient has body aches, improved hand swelling and pain, multiple joint pain and body aches, occasional morning stiffness, bl hand swelling improved, history of lumbar spinal surgery in the past years ago, and R wrist fracture did not require surgery 2017 ( after fall in the street), currently doing PT getting Chemotherapy with Pempbrolizumab, as swelling improved can continue as needed for Tx follow up with oncology if recurrent pain and swelling then restart Prednisone as discussed  currently off prednisone, with workup 10/31/24  + DOMENIC 1:160  and + RNP 1.4  no other symptoms to suggest mixed connective tissue disease will monitor symptoms as discussed, negative myomarker panel   10/11/24  first time evaluated by me for bl hand swelling and stiffness possible inflammatory arthritis after started tx with Pempbrolizumab for 12 month that was interrupted due to development of arthritis then started with Prednisone 30 mg with taper over last 3 weeks with resolved bl hand swelling and stiffness, denies pain even before arthritis  rest of the review system negative , prednisone down to 10 mg for this week

## 2025-05-08 NOTE — ASSESSMENT
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 Slade Baldwin. completed adjuvant chemotherapy for 4 cycles followed by 1 year of immunotherapy. Preferred adjuvant chemotherapy will consist of pemetrexed and carboplatin given non-squamous histology. She received 1 cycle of carboplatin only as her GFR was worsened than her baseline and she could not receive Pemetrexed. Changed adjuvant chemotherapy to carboplatin/gemcitabine. -She completed adjuvant carboplatin/gemcitabine on 9/28/2023,  -On pembrolizumab for 12 months per keynote 091 showing improvement in DFS and OS compared to placebo, due to arthritis with L hand swelling and whole body stiffness with also limited range of motion of bl shoulders vs weakness missed one dose  plan was total of 18 cycles of  Pembrolizumab finished 11/22/24  , bl hand swelling improved with prednisone ,  body aches intermittent morning stiffness -with workup had + DOMENIC  1:160 and RNP 1.4   10/31/24  but no other symptoms of mixed connective tissue disease , Bharat marker panel was negative will monitor for symptoms as discussed , bl hand pain and swelling improved tx with prednisone only persistent R ankle pain that is improving with therapy , also has chronic lower back pain and shoulder pain no sig stiffness   -since 12/24  pain and swelling or R ankle evaluated by podiatry 1/25 and prior to that PMD gave Steroid dose pack partial improvement , also had R ankle steroid injection mild improvement is able to ambulate with boot still swollen less painful no other joint pain or swelling , and partially improved with PT able to ambulate   -MRI of L ankle:  ordered by Podiatry Dr Gustavo Kwong 1/20/25   diffuse subacutaneous and intramuscular edema about the ankle, also small focus of remote osteochondral injury along the medial talar bone,  small tibiotalar and posterior subtalar joint effusion with synovitis, mild second tarsometatarsal joint osteoarthrosis mild peroneal tenosynovitis without tendinosis or tear  -gave medrol Dose pack has progression CKD, crystal arthritis also possible but as pain improving PT less likely due to multiarticular arthritis up trending inflammatory markers can be infectious inflammatory and due to malignancy pending workup for renal mass , no symptoms for GCA currently   2/4/25 Chest CT with IV contrast 1.  Stable appearance of lungs. No suspicious pulmonary nodules. Stable post left lower lobectomy surgical changes. No evidence of tumor recurrence. 2.  Previously characterized, exophytic lesion in the upper pole of the left kidney. Possible malignancy. -followed by Urology due to renal mass concerning for malignancy    -previous history of lumbar spinal surgery years ago , due to recurrent back pain planned to follow up with [facility]    -arthritis body aches and stiffness improved with prednisone, no current synovitis on exam, finished chemotherapy 11/22/24 no current pain or swelling of hands    -DEXA  11/19/24 Spine T score 0.6, Femoral neck  -0.7  and total hip -0.2  with normal bone density , history of R wrist fracture with fall on ice 2017 healed without surgery ,     Daughter: Mildred Fairchild

## 2025-05-13 NOTE — BEGINNING OF VISIT
[0] : 2) Feeling down, depressed, or hopeless: Not at all (0) [PHQ-2 Negative] : PHQ-2 Negative [Advised Primary Care Follow-up] : Advised Primary Care Follow-up  [QIS4Eyzik] : 0 [Never] : Never [Reviewed, no changes] : Reviewed, no changes [Abdominal Pain] : no abdominal pain [Vomiting] : no vomiting [Constipation] : no constipation

## 2025-05-13 NOTE — BEGINNING OF VISIT
[0] : 2) Feeling down, depressed, or hopeless: Not at all (0) [PHQ-2 Negative] : PHQ-2 Negative [Advised Primary Care Follow-up] : Advised Primary Care Follow-up  [[id number]] : 0 [Never] : Never [Reviewed, no changes] : Reviewed, no changes [Abdominal Pain] : no abdominal pain [Vomiting] : no vomiting [Constipation] : no constipation

## 2025-05-13 NOTE — ASSESSMENT
[FreeTextEntry1] : DX: renal mass, likely RCC Plan  Given comorbidities plan for continued AS at this time R/B/A and risks of progression fully reviewed  MRI in 6 months The total amount of time I have personally spent preparing for this visit, reviewing the patient's test results, obtaining external history, ordering tests/medications, documenting clinical information, communicating with and counseling the patient/family and/or caregiver(s), and spent face to face with the patient explaining the above was minutes =20   Antonino Parr MD, FACS, FRCS  of Urology [facility] Director of Laparoscopic and Robotic Surgery [facility] Director of Urology, [facility] Professor of Urology   (Office) [phone] (Cell)  [phone] [email or web address]

## 2025-05-13 NOTE — BEGINNING OF VISIT
[0] : 2) Feeling down, depressed, or hopeless: Not at all (0) [PHQ-2 Negative] : PHQ-2 Negative [Advised Primary Care Follow-up] : Advised Primary Care Follow-up  [[id number]] : 0 [Never] : Never [Reviewed, no changes] : Reviewed, no changes [Abdominal Pain] : no abdominal pain [Vomiting] : no vomiting [Constipation] : no constipation

## 2025-05-15 NOTE — HISTORY OF PRESENT ILLNESS
[Disease: _____________________] : Disease: [unfilled] [T: ___] : T[unfilled] [N: ___] : N[unfilled] [M: ___] : M[unfilled] [AJCC Stage: ____] : AJCC Stage: [unfilled] [1] : 1, Mild [D] : probable [90: Able to carry normal activity; minor signs or symptoms of disease.] : 90: Able to carry normal activity; minor signs or symptoms of disease.  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  Daughter: Mildred Fairchild   Onc hx: 12/2022: started having chest congestion, went to her PCP, who ordered CXR which showed a lung mass. She then had CT chest which showed a LLL mass, and a PET scan which showed only LLL mass. CT chest (Nov 2022): Mixed density consolidation in the left lower lobe is again seen.  There is mild interval progression of the peribronchial changes extending to the posterior medial lung base.  The more solid component seen anteriorly, bulging the major fissure is more significantly progressed measuring up to 5.4 x 3.1 cm in axial cross-section, previously 2.7 x 1.4 cm. PET (Dec 2022): Mild heterogeneous hypermetabolic activity in large parenchymal consolidation associated with volume loss and bronchiectasis in the left lower lobe of the lung.  The most intense activity is in the periphery of this consolidation.  This area of slightly more intense hypermetabolic activity may represent active infection/inflammation superimposed upon chronic lung disease.  Status post thyroidectomy.  Diverticular disease.  Fibroid uterus. 2/14/2022: Was sent to Dr. Acosta by Dr. Dc 4/21/2023: Final Diagnosis Left lower lobe, lung biopsy: -   Bronchial wall and adjoining parenchyma with atypical micropapillary cell clusters, most consistent with mucinous adenocarcinoma (serial sections examined). Note: The micropapillary clusters are composed of eosinophilic columnar cells, some with intracytoplasmic mucin vacuoles. On IHC they stain strongly for CK7, weakly for GATA3, and are negative for ER/DE, PAX8, TTF1, Napsin, p40, CD68 and CD31. Overall findings are compatible with mucinous adenocarcinoma of lung. Upper GI/pancreaticobiliary cancer, is also in the differential. PD-L1 3%  5/2/23 CT CAP 1. Since 12/9/2022, no significant change in persistent consolidation in left lower lobe, consistent with the reported mucinous adenocarcinoma. No lymphadenopathy or metastatic disease. 2. A 2.0 cm left renal mass is suspicious for renal cell carcinoma, probably clear cell type. 3. There are a few small hypodensities in the pancreatic head, some of which may be cystic. MRI of pancreas recommended for further evaluation.  5/3/23 MR BRAIN No evidence of intracranial metastatic disease.  Chronic small vessel ischemic findings, as above.  5/4/2023: TTB - surgery upfront given node negativity  5/23/2023: Pathology: Final Diagnosis  1.  Left level 9 lymph node, excision: -   Negative for tumor, 2 lymph nodes  (0/2)  2.  Level 8 lymph node, excision: -   Negative for tumor, 4 lymph nodes  (0/4)  3.  Level 7 lymph node, excision: -   Negative for tumor, 2 lymph nodes  (0/2)  4.  Left level 11 lymph nodes, excision: -   Negative for tumor, 1 lymph node  (0/1)  5.  Left level 12 lymph node, excision: -   Negative for tumor, 1 lymph node  (0/1)  6.  Left level 11 lymph nodes #2, excision: -   Negative for tumor, 1 lymph nodes  (0/1)  7.  Level 5 lymph nodes, excision: -   Negative for tumor, 2 lymph nodes  (0/2)  8.  Left lower lobe of lung, lobectomy: -   Mucinous adenocarcinoma, multifocal, less than 9 cm, invasive acinar and lipidic -   Negative for pleural invasion -   No evidence of lymphovascular invasion -   Bronchial and vascular margins negative for tumor -   Seven lymph nodes, negative for tumor  (0/7) -   Desquamative interstitial pneumonitis, prominent -   Additional findings: bronchiolization of airspaces, multifocal, minute focus of pneumonia  Comment: The tumor is multifocal within the mass with multiple separate foci of invasion, the two largest measuring 1 cm each (8I, 8K).  The mass grossly measures 9 cm, however, a few sections described as mass are negative for tumor, showing only desquamative interstitial pneumonitis.  Verified by: Sissy Bae M.D (Electronic Signature) Reported on: 05/30/23 09:34 EDT, [facility], [address] [place] [address], [place] [address] Phone: [phone]   Fax: [phone] _________________________________________________________________  Synoptic Summary 8: Lung - Resection Specimen Procedure:  Lobectomy Specimen Laterality:   Left Tumor Tumor Focality:   Multifocal tumor nodules of similar histology  type not considered intrapulmonary metastases or too numerous for separate synoptic reports Tumor Site:  Lower lobe of lung Tumor Size Total Tumor Size:   9 Centimeters (cm) Histologic Type:   Invasive mucinous adenocarcinoma Visceral Pleura Invasion:   Not identified Direct Invasion of Adjacent Structures:    Not applicable (no adjacent structures present) Treatment Effect:   No known presurgical therapy Lymphovascular Invasion:   Not identified Margins Margin Status for Invasive Carcinoma:    All margins negative for invasive carcinoma Closest Margin(s) to Invasive Carcinoma:    Bronchial - 0.5; Vascular - 0.5 Distance from Invasive Carcinoma to Closest Margin:    At least 0.5 cm Margin Status for Non-Invasive Tumor:    All margins negative for non-invasive tumor Regional Lymph Nodes Lymph Node(s) from Prior Procedures:    No known prior lymph node sampling performed Regional Lymph Node Status:   All regional lymph nodes negative for tumor Number of Lymph Nodes Examined:   20 Sujey Site(s) Examined:   7: Subcarinal;  5: Subaortic / aortopulmonary (AP) / AP window;  8L: Para-esophageal;  9L: Pulmonary ligament;  10L: Hilar;  11L: Interlobar;  12L: Lobar Pathologic Stage Classification (pTNM, AJCC 8th Edition) pT Category:   pT4 pN Category:   pN0 10/16/23: CT Chest: Since May 3, 2023, no recurrence.  12/29/23: MRI Lumbar:  1.  No evidence of osseous metastasis. 2.  L3-4 severe spinal canal stenosis with effacement of the thecal sac and severe bilateral neural foraminal narrowing. There is mass effect on the exiting and descending nerve roots at this level. 3.  L5-S1 disc herniation contacts the descending right S1 nerve root.  12/29/23: MRI abdomen:  1.  As noted on previous studies enhancing mass upper pole left kidney; stable when compared to the most recent study from 9/20/2023, however, increased when compared to CT from 5/3/2022. It is suspicious for neoplasm. 2.  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 Recommend continued annual follow-up MRI for these lesions.  3/15/24: CT Chest:  1. Since 10/16/2023, there is no evidence of recurrent or metastatic lung cancer in the chest. 2. No change 2.4 cm left renal mass, probably a clear cell type renal cell carcinoma.  6/17/2024: CT chest: 1. Post left lower lobe lobectomy. No evidence of local disease recurrence or metastasis within the field-of-view. 2. 2.4 cm stable exophytic heterogeneously enhancing renal mass. Renal neoplasm cannot be excluded and correlation with prior MRI suggested.  10/17/2024: CT chest: 1. Left lower lobectomy. 3 mm groundglass micronodule at the posterior, peripheral left midlung, of questionable clinical significance. 2. 2.7 cm enhancing exophytic mass at the upper left kidney, possibly malignancy. Correlate with prior MRI findings.  1/13/25: MRI Abdomen: 1. Since 10/17/2024, there has been an increase in the size of the now 2.9 cm mass in the left kidney which is consistent with a clear cell renal cell carcinoma.  2. 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 Recommend follow-up MRI in one year.  1/28/25: CT Chest: 1.  Stable appearance of lungs. No suspicious pulmonary nodules. Stable post left lower lobectomy surgical changes. No evidence of tumor recurrence. 2.  Previously characterized, exophytic lesion in the upper pole of the left kidney. Possible malignancy. [de-identified] : Mucinous adenocarcinoma - PD-L1 3%, GNAS R201C, KRAS G12V, VUS in MARIO [de-identified] : Pulm:  CTSx:   [FreeTextEntry1] : 7/7/2023: C1 carboplatin (pemetrexed held due to worsening Cr and GFR<45)  7/28/2023: C2D1 carboplatin/gem  8/04/2023  C2D8 Rabun  8/25/2023: C3D1 carboplatin/Rabun  9/1/2023:   C3D8 Rabun   9/15/2023  C4D1  carbo/gem   9/21/23   C4D8 gem hold, neutropenic   9/28/23   C4D8 Rabun  11/23/2023: C1 pembrolizumab  12/8/2023: C2 pembrolizumab   12/28/23 C3 pembro  1/18/2024  C4 Pembro  2/9/2024    C5 Pembro   3/1/24.  C6 Pembro  3/22/2024: C7 pembrolizumab  4/12/2024: C8 pembrolizumab  5/3/2024:  C9 Pembro   5/24/24    C10  pembro   6/14/24    C11  Pembro   7/5/24      C12 Pembro   7/26/24    C13 pembrolizumab  8/23/24    C14 pushed a week back due to travel   9/13/2024: C15 pembrolizumab  11/1/2024: C16 pembrolizumab   11/22/2024: C17 pembrolizumab  [de-identified] : Here today for follow-up. Denies SOB, fever, fatigue, unintentional weight loss. Ankle swelling has improved, follows with Rheum. Has f/u with Dr Parr this afternoon   CT Chest 5/6/25:  1. No recurrent or metastatic disease in the chest. 2. Slight increase in size of a 3.1 cm exophytic mass at the upper left kidney, most likely a renal cell carcinoma. [FreeTextEntry3] : b/l hand stiffness due to arthritis [FreeTextEntry5] : andre  [FreeTextEntry7] : lung cancer  [FreeTextEntry8] : scalp psoriasis flare up  [FreeTextEntry9] : 2/29/24  [de-identified] : andre  [de-identified] : lung cancer  [de-identified] : Topical steroid ointment , improved 6/11/24

## 2025-05-15 NOTE — PHYSICAL EXAM
[Restricted in physically strenuous activity but ambulatory and able to carry out work of a light or sedentary nature] : Status 1- Restricted in physically strenuous activity but ambulatory and able to carry out work of a light or sedentary nature, e.g., light house work, office work [Normal] : affect appropriate [de-identified] : Left facial asymmetry (hx [place] palsy 2021). [de-identified] : Left eye ptosis and tearing, rare twitching (hx [place] palsy 2021).

## 2025-05-15 NOTE — PHYSICAL EXAM
[Restricted in physically strenuous activity but ambulatory and able to carry out work of a light or sedentary nature] : Status 1- Restricted in physically strenuous activity but ambulatory and able to carry out work of a light or sedentary nature, e.g., light house work, office work [Normal] : affect appropriate [de-identified] : Left facial asymmetry (hx [place] palsy 2021). [de-identified] : Left eye ptosis and tearing, rare twitching (hx [place] palsy 2021).

## 2025-05-15 NOTE — PHYSICAL EXAM
[Restricted in physically strenuous activity but ambulatory and able to carry out work of a light or sedentary nature] : Status 1- Restricted in physically strenuous activity but ambulatory and able to carry out work of a light or sedentary nature, e.g., light house work, office work [Normal] : affect appropriate [de-identified] : Left facial asymmetry (hx [place] palsy 2021). [de-identified] : Left eye ptosis and tearing, rare twitching (hx [place] palsy 2021).

## 2025-05-15 NOTE — END OF VISIT
[] : Fellow [FreeTextEntry3] : Seen with fellow, Dr.Maria Porter.  77 yo F with Stage IIIA NSCLC s/p resection and chemotherapy with carbo/gem x 4 cycles and pembrolizumab, now completed one year of io therapy per KEYNOTE 091. Feels ok. Arthritis stable. Completed therapy, and no plan for further treatment unless progression of disease. Repeat Ct chest BERTHA --ordered CBC, CMP, TSH today --f/u with  regarding possible surgical intervention on kidney mass --RTC in 3 month with repeat CT chest [Time Spent: ___ minutes] : I have spent [unfilled] minutes of time on the encounter which excludes teaching and separately reported services.

## 2025-05-15 NOTE — HISTORY OF PRESENT ILLNESS
[Disease: _____________________] : Disease: [unfilled] [T: ___] : T[unfilled] [N: ___] : N[unfilled] [M: ___] : M[unfilled] [AJCC Stage: ____] : AJCC Stage: [unfilled] [1] : 1, Mild [D] : probable [90: Able to carry normal activity; minor signs or symptoms of disease.] : 90: Able to carry normal activity; minor signs or symptoms of disease.  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  Daughter: Mildred Fairchild   Onc hx: 12/2022: started having chest congestion, went to her PCP, who ordered CXR which showed a lung mass. She then had CT chest which showed a LLL mass, and a PET scan which showed only LLL mass. CT chest (Nov 2022): Mixed density consolidation in the left lower lobe is again seen.  There is mild interval progression of the peribronchial changes extending to the posterior medial lung base.  The more solid component seen anteriorly, bulging the major fissure is more significantly progressed measuring up to 5.4 x 3.1 cm in axial cross-section, previously 2.7 x 1.4 cm. PET (Dec 2022): Mild heterogeneous hypermetabolic activity in large parenchymal consolidation associated with volume loss and bronchiectasis in the left lower lobe of the lung.  The most intense activity is in the periphery of this consolidation.  This area of slightly more intense hypermetabolic activity may represent active infection/inflammation superimposed upon chronic lung disease.  Status post thyroidectomy.  Diverticular disease.  Fibroid uterus. 2/14/2022: Was sent to Dr. Acosta by Dr. Dc 4/21/2023: Final Diagnosis Left lower lobe, lung biopsy: -   Bronchial wall and adjoining parenchyma with atypical micropapillary cell clusters, most consistent with mucinous adenocarcinoma (serial sections examined). Note: The micropapillary clusters are composed of eosinophilic columnar cells, some with intracytoplasmic mucin vacuoles. On IHC they stain strongly for CK7, weakly for GATA3, and are negative for ER/TN, PAX8, TTF1, Napsin, p40, CD68 and CD31. Overall findings are compatible with mucinous adenocarcinoma of lung. Upper GI/pancreaticobiliary cancer, is also in the differential. PD-L1 3%  5/2/23 CT CAP 1. Since 12/9/2022, no significant change in persistent consolidation in left lower lobe, consistent with the reported mucinous adenocarcinoma. No lymphadenopathy or metastatic disease. 2. A 2.0 cm left renal mass is suspicious for renal cell carcinoma, probably clear cell type. 3. There are a few small hypodensities in the pancreatic head, some of which may be cystic. MRI of pancreas recommended for further evaluation.  5/3/23 MR BRAIN No evidence of intracranial metastatic disease.  Chronic small vessel ischemic findings, as above.  5/4/2023: TTB - surgery upfront given node negativity  5/23/2023: Pathology: Final Diagnosis  1.  Left level 9 lymph node, excision: -   Negative for tumor, 2 lymph nodes  (0/2)  2.  Level 8 lymph node, excision: -   Negative for tumor, 4 lymph nodes  (0/4)  3.  Level 7 lymph node, excision: -   Negative for tumor, 2 lymph nodes  (0/2)  4.  Left level 11 lymph nodes, excision: -   Negative for tumor, 1 lymph node  (0/1)  5.  Left level 12 lymph node, excision: -   Negative for tumor, 1 lymph node  (0/1)  6.  Left level 11 lymph nodes #2, excision: -   Negative for tumor, 1 lymph nodes  (0/1)  7.  Level 5 lymph nodes, excision: -   Negative for tumor, 2 lymph nodes  (0/2)  8.  Left lower lobe of lung, lobectomy: -   Mucinous adenocarcinoma, multifocal, less than 9 cm, invasive acinar and lipidic -   Negative for pleural invasion -   No evidence of lymphovascular invasion -   Bronchial and vascular margins negative for tumor -   Seven lymph nodes, negative for tumor  (0/7) -   Desquamative interstitial pneumonitis, prominent -   Additional findings: bronchiolization of airspaces, multifocal, minute focus of pneumonia  Comment: The tumor is multifocal within the mass with multiple separate foci of invasion, the two largest measuring 1 cm each (8I, 8K).  The mass grossly measures 9 cm, however, a few sections described as mass are negative for tumor, showing only desquamative interstitial pneumonitis.  Verified by: Sissy Bae M.D (Electronic Signature) Reported on: 05/30/23 09:34 EDT, [facility], [address] [place] [address], [place] [id number] Phone: [phone]   Fax: [phone] _________________________________________________________________  Synoptic Summary 8: Lung - Resection Specimen Procedure:  Lobectomy Specimen Laterality:   Left Tumor Tumor Focality:   Multifocal tumor nodules of similar histology  type not considered intrapulmonary metastases or too numerous for separate synoptic reports Tumor Site:  Lower lobe of lung Tumor Size Total Tumor Size:   9 Centimeters (cm) Histologic Type:   Invasive mucinous adenocarcinoma Visceral Pleura Invasion:   Not identified Direct Invasion of Adjacent Structures:    Not applicable (no adjacent structures present) Treatment Effect:   No known presurgical therapy Lymphovascular Invasion:   Not identified Margins Margin Status for Invasive Carcinoma:    All margins negative for invasive carcinoma Closest Margin(s) to Invasive Carcinoma:    Bronchial - 0.5; Vascular - 0.5 Distance from Invasive Carcinoma to Closest Margin:    At least 0.5 cm Margin Status for Non-Invasive Tumor:    All margins negative for non-invasive tumor Regional Lymph Nodes Lymph Node(s) from Prior Procedures:    No known prior lymph node sampling performed Regional Lymph Node Status:   All regional lymph nodes negative for tumor Number of Lymph Nodes Examined:   20 Sujey Site(s) Examined:   7: Subcarinal;  5: Subaortic / aortopulmonary (AP) / AP window;  8L: Para-esophageal;  9L: Pulmonary ligament;  10L: Hilar;  11L: Interlobar;  12L: Lobar Pathologic Stage Classification (pTNM, AJCC 8th Edition) pT Category:   pT4 pN Category:   pN0 10/16/23: CT Chest: Since May 3, 2023, no recurrence.  12/29/23: MRI Lumbar:  1.  No evidence of osseous metastasis. 2.  L3-4 severe spinal canal stenosis with effacement of the thecal sac and severe bilateral neural foraminal narrowing. There is mass effect on the exiting and descending nerve roots at this level. 3.  L5-S1 disc herniation contacts the descending right S1 nerve root.  12/29/23: MRI abdomen:  1.  As noted on previous studies enhancing mass upper pole left kidney; stable when compared to the most recent study from 9/20/2023, however, increased when compared to CT from 5/3/2022. It is suspicious for neoplasm. 2.  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 Recommend continued annual follow-up MRI for these lesions.  3/15/24: CT Chest:  1. Since 10/16/2023, there is no evidence of recurrent or metastatic lung cancer in the chest. 2. No change 2.4 cm left renal mass, probably a clear cell type renal cell carcinoma.  6/17/2024: CT chest: 1. Post left lower lobe lobectomy. No evidence of local disease recurrence or metastasis within the field-of-view. 2. 2.4 cm stable exophytic heterogeneously enhancing renal mass. Renal neoplasm cannot be excluded and correlation with prior MRI suggested.  10/17/2024: CT chest: 1. Left lower lobectomy. 3 mm groundglass micronodule at the posterior, peripheral left midlung, of questionable clinical significance. 2. 2.7 cm enhancing exophytic mass at the upper left kidney, possibly malignancy. Correlate with prior MRI findings.  1/13/25: MRI Abdomen: 1. Since 10/17/2024, there has been an increase in the size of the now 2.9 cm mass in the left kidney which is consistent with a clear cell renal cell carcinoma.  2. 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 Recommend follow-up MRI in one year.  1/28/25: CT Chest: 1.  Stable appearance of lungs. No suspicious pulmonary nodules. Stable post left lower lobectomy surgical changes. No evidence of tumor recurrence. 2.  Previously characterized, exophytic lesion in the upper pole of the left kidney. Possible malignancy. [de-identified] : Mucinous adenocarcinoma - PD-L1 3%, GNAS R201C, KRAS G12V, VUS in MARIO [de-identified] : Pulm:  CTSx:   [FreeTextEntry1] : 7/7/2023: C1 carboplatin (pemetrexed held due to worsening Cr and GFR<45)  7/28/2023: C2D1 carboplatin/gem  8/04/2023  C2D8 Powell  8/25/2023: C3D1 carboplatin/[place]  9/1/2023:   C3D8 Powell   9/15/2023  C4D1  carbo/gem   9/21/23   C4D8 gem hold, neutropenic   9/28/23   C4D8 Powell  11/23/2023: C1 pembrolizumab  12/8/2023: C2 pembrolizumab   12/28/23 C3 pembro  1/18/2024  C4 Pembro  2/9/2024    C5 Pembro   3/1/24.  C6 Pembro  3/22/2024: C7 pembrolizumab  4/12/2024: C8 pembrolizumab  5/3/2024:  C9 Pembro   5/24/24    C10  pembro   6/14/24    C11  Pembro   7/5/24      C12 Pembro   7/26/24    C13 pembrolizumab  8/23/24    C14 pushed a week back due to travel   9/13/2024: C15 pembrolizumab  11/1/2024: C16 pembrolizumab   11/22/2024: C17 pembrolizumab  [de-identified] : Here today for follow-up. Denies SOB, fever, fatigue, unintentional weight loss. Ankle swelling has improved, follows with Rheum. Has f/u with Dr Parr this afternoon   CT Chest 5/6/25:  1. No recurrent or metastatic disease in the chest. 2. Slight increase in size of a 3.1 cm exophytic mass at the upper left kidney, most likely a renal cell carcinoma. [FreeTextEntry3] : b/l hand stiffness due to arthritis [FreeTextEntry5] : andre  [FreeTextEntry7] : lung cancer  [FreeTextEntry8] : scalp psoriasis flare up  [FreeTextEntry9] : 2/29/24  [de-identified] : andre  [de-identified] : lung cancer  [de-identified] : Topical steroid ointment , improved 6/11/24

## 2025-05-15 NOTE — END OF VISIT
[] : Fellow [FreeTextEntry3] : Seen with fellow, Dr.Maria Porter.  79 yo F with Stage IIIA NSCLC s/p resection and chemotherapy with carbo/gem x 4 cycles and pembrolizumab, now completed one year of io therapy per KEYNOTE 091. Feels ok. Arthritis stable. Completed therapy, and no plan for further treatment unless progression of disease. Repeat Ct chest BERTHA --ordered CBC, CMP, TSH today --f/u with  regarding possible surgical intervention on kidney mass --RTC in 3 month with repeat CT chest [Time Spent: ___ minutes] : I have spent [unfilled] minutes of time on the encounter which excludes teaching and separately reported services.

## 2025-05-15 NOTE — HISTORY OF PRESENT ILLNESS
[Disease: _____________________] : Disease: [unfilled] [T: ___] : T[unfilled] [N: ___] : N[unfilled] [M: ___] : M[unfilled] [AJCC Stage: ____] : AJCC Stage: [unfilled] [1] : 1, Mild [D] : probable [90: Able to carry normal activity; minor signs or symptoms of disease.] : 90: Able to carry normal activity; minor signs or symptoms of disease.  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  Daughter: Mildred Fairchild   Onc hx: 12/2022: started having chest congestion, went to her PCP, who ordered CXR which showed a lung mass. She then had CT chest which showed a LLL mass, and a PET scan which showed only LLL mass. CT chest (Nov 2022): Mixed density consolidation in the left lower lobe is again seen.  There is mild interval progression of the peribronchial changes extending to the posterior medial lung base.  The more solid component seen anteriorly, bulging the major fissure is more significantly progressed measuring up to 5.4 x 3.1 cm in axial cross-section, previously 2.7 x 1.4 cm. PET (Dec 2022): Mild heterogeneous hypermetabolic activity in large parenchymal consolidation associated with volume loss and bronchiectasis in the left lower lobe of the lung.  The most intense activity is in the periphery of this consolidation.  This area of slightly more intense hypermetabolic activity may represent active infection/inflammation superimposed upon chronic lung disease.  Status post thyroidectomy.  Diverticular disease.  Fibroid uterus. 2/14/2022: Was sent to Dr. Acosta by Dr. Dc 4/21/2023: Final Diagnosis Left lower lobe, lung biopsy: -   Bronchial wall and adjoining parenchyma with atypical micropapillary cell clusters, most consistent with mucinous adenocarcinoma (serial sections examined). Note: The micropapillary clusters are composed of eosinophilic columnar cells, some with intracytoplasmic mucin vacuoles. On IHC they stain strongly for CK7, weakly for GATA3, and are negative for ER/VT, PAX8, TTF1, Napsin, p40, CD68 and CD31. Overall findings are compatible with mucinous adenocarcinoma of lung. Upper GI/pancreaticobiliary cancer, is also in the differential. PD-L1 3%  5/2/23 CT CAP 1. Since 12/9/2022, no significant change in persistent consolidation in left lower lobe, consistent with the reported mucinous adenocarcinoma. No lymphadenopathy or metastatic disease. 2. A 2.0 cm left renal mass is suspicious for renal cell carcinoma, probably clear cell type. 3. There are a few small hypodensities in the pancreatic head, some of which may be cystic. MRI of pancreas recommended for further evaluation.  5/3/23 MR BRAIN No evidence of intracranial metastatic disease.  Chronic small vessel ischemic findings, as above.  5/4/2023: TTB - surgery upfront given node negativity  5/23/2023: Pathology: Final Diagnosis  1.  Left level 9 lymph node, excision: -   Negative for tumor, 2 lymph nodes  (0/2)  2.  Level 8 lymph node, excision: -   Negative for tumor, 4 lymph nodes  (0/4)  3.  Level 7 lymph node, excision: -   Negative for tumor, 2 lymph nodes  (0/2)  4.  Left level 11 lymph nodes, excision: -   Negative for tumor, 1 lymph node  (0/1)  5.  Left level 12 lymph node, excision: -   Negative for tumor, 1 lymph node  (0/1)  6.  Left level 11 lymph nodes #2, excision: -   Negative for tumor, 1 lymph nodes  (0/1)  7.  Level 5 lymph nodes, excision: -   Negative for tumor, 2 lymph nodes  (0/2)  8.  Left lower lobe of lung, lobectomy: -   Mucinous adenocarcinoma, multifocal, less than 9 cm, invasive acinar and lipidic -   Negative for pleural invasion -   No evidence of lymphovascular invasion -   Bronchial and vascular margins negative for tumor -   Seven lymph nodes, negative for tumor  (0/7) -   Desquamative interstitial pneumonitis, prominent -   Additional findings: bronchiolization of airspaces, multifocal, minute focus of pneumonia  Comment: The tumor is multifocal within the mass with multiple separate foci of invasion, the two largest measuring 1 cm each (8I, 8K).  The mass grossly measures 9 cm, however, a few sections described as mass are negative for tumor, showing only desquamative interstitial pneumonitis.  Verified by: Sissy Bae M.D (Electronic Signature) Reported on: 05/30/23 09:34 EDT, [facility], [address] [place] [address], [place] [address] Phone: [phone]   Fax: [phone] _________________________________________________________________  Synoptic Summary 8: Lung - Resection Specimen Procedure:  Lobectomy Specimen Laterality:   Left Tumor Tumor Focality:   Multifocal tumor nodules of similar histology  type not considered intrapulmonary metastases or too numerous for separate synoptic reports Tumor Site:  Lower lobe of lung Tumor Size Total Tumor Size:   9 Centimeters (cm) Histologic Type:   Invasive mucinous adenocarcinoma Visceral Pleura Invasion:   Not identified Direct Invasion of Adjacent Structures:    Not applicable (no adjacent structures present) Treatment Effect:   No known presurgical therapy Lymphovascular Invasion:   Not identified Margins Margin Status for Invasive Carcinoma:    All margins negative for invasive carcinoma Closest Margin(s) to Invasive Carcinoma:    Bronchial - 0.5; Vascular - 0.5 Distance from Invasive Carcinoma to Closest Margin:    At least 0.5 cm Margin Status for Non-Invasive Tumor:    All margins negative for non-invasive tumor Regional Lymph Nodes Lymph Node(s) from Prior Procedures:    No known prior lymph node sampling performed Regional Lymph Node Status:   All regional lymph nodes negative for tumor Number of Lymph Nodes Examined:   20 Sujey Site(s) Examined:   7: Subcarinal;  5: Subaortic / aortopulmonary (AP) / AP window;  8L: Para-esophageal;  9L: Pulmonary ligament;  10L: Hilar;  11L: Interlobar;  12L: Lobar Pathologic Stage Classification (pTNM, AJCC 8th Edition) pT Category:   pT4 pN Category:   pN0 10/16/23: CT Chest: Since May 3, 2023, no recurrence.  12/29/23: MRI Lumbar:  1.  No evidence of osseous metastasis. 2.  L3-4 severe spinal canal stenosis with effacement of the thecal sac and severe bilateral neural foraminal narrowing. There is mass effect on the exiting and descending nerve roots at this level. 3.  L5-S1 disc herniation contacts the descending right S1 nerve root.  12/29/23: MRI abdomen:  1.  As noted on previous studies enhancing mass upper pole left kidney; stable when compared to the most recent study from 9/20/2023, however, increased when compared to CT from 5/3/2022. It is suspicious for neoplasm. 2.  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 Recommend continued annual follow-up MRI for these lesions.  3/15/24: CT Chest:  1. Since 10/16/2023, there is no evidence of recurrent or metastatic lung cancer in the chest. 2. No change 2.4 cm left renal mass, probably a clear cell type renal cell carcinoma.  6/17/2024: CT chest: 1. Post left lower lobe lobectomy. No evidence of local disease recurrence or metastasis within the field-of-view. 2. 2.4 cm stable exophytic heterogeneously enhancing renal mass. Renal neoplasm cannot be excluded and correlation with prior MRI suggested.  10/17/2024: CT chest: 1. Left lower lobectomy. 3 mm groundglass micronodule at the posterior, peripheral left midlung, of questionable clinical significance. 2. 2.7 cm enhancing exophytic mass at the upper left kidney, possibly malignancy. Correlate with prior MRI findings.  1/13/25: MRI Abdomen: 1. Since 10/17/2024, there has been an increase in the size of the now 2.9 cm mass in the left kidney which is consistent with a clear cell renal cell carcinoma.  2. 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 Recommend follow-up MRI in one year.  1/28/25: CT Chest: 1.  Stable appearance of lungs. No suspicious pulmonary nodules. Stable post left lower lobectomy surgical changes. No evidence of tumor recurrence. 2.  Previously characterized, exophytic lesion in the upper pole of the left kidney. Possible malignancy. [de-identified] : Mucinous adenocarcinoma - PD-L1 3%, GNAS R201C, KRAS G12V, VUS in MARIO [de-identified] : Pulm:  CTSx:   [FreeTextEntry1] : 7/7/2023: C1 carboplatin (pemetrexed held due to worsening Cr and GFR<45)  7/28/2023: C2D1 carboplatin/gem  8/04/2023  C2D8 [place]  8/25/2023: C3D1 carboplatin/[place]  9/1/2023:   C3D8 [place]   9/15/2023  C4D1  carbo/gem   9/21/23   C4D8 gem hold, neutropenic   9/28/23   C4D8 [place]  11/23/2023: C1 pembrolizumab  12/8/2023: C2 pembrolizumab   12/28/23 C3 pembro  1/18/2024  C4 Pembro  2/9/2024    C5 Pembro   3/1/24.  C6 Pembro  3/22/2024: C7 pembrolizumab  4/12/2024: C8 pembrolizumab  5/3/2024:  C9 Pembro   5/24/24    C10  pembro   6/14/24    C11  Pembro   7/5/24      C12 Pembro   7/26/24    C13 pembrolizumab  8/23/24    C14 pushed a week back due to travel   9/13/2024: C15 pembrolizumab  11/1/2024: C16 pembrolizumab   11/22/2024: C17 pembrolizumab  [de-identified] : Here today for follow-up. Denies SOB, fever, fatigue, unintentional weight loss. Ankle swelling has improved, follows with Rheum. Has f/u with Dr Parr this afternoon   CT Chest 5/6/25:  1. No recurrent or metastatic disease in the chest. 2. Slight increase in size of a 3.1 cm exophytic mass at the upper left kidney, most likely a renal cell carcinoma. [FreeTextEntry3] : b/l hand stiffness due to arthritis [FreeTextEntry5] : andre  [FreeTextEntry7] : lung cancer  [FreeTextEntry8] : scalp psoriasis flare up  [FreeTextEntry9] : 2/29/24  [de-identified] : andre  [de-identified] : lung cancer  [de-identified] : Topical steroid ointment , improved 6/11/24

## 2025-05-15 NOTE — ASSESSMENT
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  #NSCLC stage IIIA (pT4 N0 M0) s/p left lower lung biopsy (04/20/23) with [employer] NGS showing low TMB (1.6 mutations/MB), MSI negative (4.88%), TPS 3%.  s/p left lower lobectomy with RLND, left VATS (05/23/23) with Dr. Slade Baldwin. 0/20 lymph nodes. Negative surgical margins. Tumor was multifocal within the mass with multiple separate foci of invasion, the two largest measuring 1 cm. The mass grossly measures 9 cm. Surgery was uneventful and patient is recovering well. - CrCl calculated to be < 40 mL/min. Per guidelines for treatment with curative intent, she is cisplatin-ineligible. - Given stage IIIA disease, completed adjuvant chemotherapy for 4 cycles followed by 1 year of immunotherapy. Preferred adjuvant chemotherapy will consist of pemetrexed and carboplatin given non-squamous histology. She received 1 cycle of carboplatin only as her GFR was worsened than her baseline and she could not receive Pemetrexed. Changed adjuvant chemotherapy to carboplatin/gemcitabine. -She completed adjuvant carboplatin/gemcitabine on 9/28/2023 --She is s/p pembrolizumab for 17 cycles, completed on 11/22/2024 per keynote 091 which showed improvement in DFS and OS compared to placebo. -- 5/6/25 scans with no evidence of disease recurrence   ROSALINDA on CKD - concerning for irAIN. --Management per , nephrology.  Arthritis Had grade 3 arthritis after each treatment, likely pembrolizumab induced.  -- Follows up with rheumatology.  RCC - 5/6/25 CT with slight increase in size of exophytic mass at upper left kidney, most likely renal cell carcinoma. -on active surveillance per Dr Mares, has f/u visit this afternoon  RTC 6 months with scans

## 2025-06-24 NOTE — ASSESSMENT
[FreeTextEntry1] : 78F here in followup. Since last seen 6 months ago, from ENT standpoint, is mostly status quo. Diet/lifestyle is not very conducive to reflux control. She is off PPI due to insurance reasons? Head and neck exam today remains mostly unremarkable, as above. ENT exam stable and unchanged. Regarding her coughing, phlegm, mucus - better controlled w tighter reflux management. For now, restart PPI and diet/lifestyle. Continue with pulmonary/medonc followup. RTO 6 months in followup.

## 2025-06-24 NOTE — PHYSICAL EXAM
[de-identified] : soft, no masses/lesions; well healed anterior neck incision [Nasal Endoscopy Performed] : nasal endoscopy was performed, see procedure section for findings [Midline] : trachea located in midline position [Laryngoscopy Performed] : laryngoscopy was performed, see procedure section for findings [de-identified] : large tongue base, crowded opx [Normal] : no rashes [FreeTextEntry1] : Au: EAC clear, TM intact, ME clear

## 2025-06-24 NOTE — CONSULT LETTER
[Dear  ___] : Dear  [unfilled], [Courtesy Letter:] : I had the pleasure of seeing your patient, [unfilled], in my office today. [Consult Closing:] : Thank you very much for allowing me to participate in the care of this patient.  If you have any questions, please do not hesitate to contact me. [Sincerely,] : Sincerely, [Praneeth Mcgarry MD] : Praneeth Mcgarry MD  [Department of Otolaryngology, Head and Neck Surgery] : Department of Otolaryngology, Head and Neck Surgery [[facility]] : [facility]

## 2025-06-24 NOTE — HISTORY OF PRESENT ILLNESS
[de-identified] : 78F here in followup.  Since last seen 6 months ago, from ENT standpoint, is status quo. Diet/lifestyle not very conducive to reflux control. She has not been able to get her PPI for insurance reasons.  For years, she c/o thick mucus and tickle in her throat w chronic throat clearing and coughing. She feels like she is choking. There is no voice change, wt loss or hemoptysis. There is no difficulty eating, breathing, swallowing or talking. There is no nasal congestion/obstruction, no green/yellow nasal drainage, no h/o sinusitis. Sx are throughout the day, worse when laying down at night.  No tobacco/etoh; she used to chew tobacco.  ROS otherwise unremarkable.